# Patient Record
Sex: FEMALE | Race: WHITE | HISPANIC OR LATINO | Employment: FULL TIME | URBAN - METROPOLITAN AREA
[De-identification: names, ages, dates, MRNs, and addresses within clinical notes are randomized per-mention and may not be internally consistent; named-entity substitution may affect disease eponyms.]

---

## 2017-09-26 ENCOUNTER — HOSPITAL ENCOUNTER (EMERGENCY)
Facility: MEDICAL CENTER | Age: 32
End: 2017-09-26
Attending: EMERGENCY MEDICINE | Admitting: SURGERY

## 2017-09-26 ENCOUNTER — APPOINTMENT (OUTPATIENT)
Dept: RADIOLOGY | Facility: MEDICAL CENTER | Age: 32
End: 2017-09-26
Attending: EMERGENCY MEDICINE

## 2017-09-26 VITALS
SYSTOLIC BLOOD PRESSURE: 98 MMHG | HEART RATE: 70 BPM | TEMPERATURE: 97.4 F | BODY MASS INDEX: 19.47 KG/M2 | WEIGHT: 116.84 LBS | DIASTOLIC BLOOD PRESSURE: 59 MMHG | OXYGEN SATURATION: 95 % | HEIGHT: 65 IN | RESPIRATION RATE: 16 BRPM

## 2017-09-26 DIAGNOSIS — K80.01 CALCULUS OF GALLBLADDER WITH ACUTE CHOLECYSTITIS AND OBSTRUCTION: ICD-10-CM

## 2017-09-26 DIAGNOSIS — K81.9 CHOLECYSTITIS: ICD-10-CM

## 2017-09-26 LAB
ALBUMIN SERPL BCP-MCNC: 4.5 G/DL (ref 3.2–4.9)
ALBUMIN/GLOB SERPL: 1.3 G/DL
ALP SERPL-CCNC: 70 U/L (ref 30–99)
ALT SERPL-CCNC: 20 U/L (ref 2–50)
ANION GAP SERPL CALC-SCNC: 10 MMOL/L (ref 0–11.9)
APPEARANCE UR: ABNORMAL
AST SERPL-CCNC: 27 U/L (ref 12–45)
BACTERIA #/AREA URNS HPF: ABNORMAL /HPF
BASOPHILS # BLD AUTO: 0.5 % (ref 0–1.8)
BASOPHILS # BLD: 0.08 K/UL (ref 0–0.12)
BILIRUB SERPL-MCNC: 0.7 MG/DL (ref 0.1–1.5)
BILIRUB UR QL STRIP.AUTO: NEGATIVE
BUN SERPL-MCNC: 12 MG/DL (ref 8–22)
CALCIUM SERPL-MCNC: 9.7 MG/DL (ref 8.5–10.5)
CHLORIDE SERPL-SCNC: 104 MMOL/L (ref 96–112)
CO2 SERPL-SCNC: 24 MMOL/L (ref 20–33)
COLOR UR: ABNORMAL
CREAT SERPL-MCNC: 0.62 MG/DL (ref 0.5–1.4)
EOSINOPHIL # BLD AUTO: 0.09 K/UL (ref 0–0.51)
EOSINOPHIL NFR BLD: 0.6 % (ref 0–6.9)
EPI CELLS #/AREA URNS HPF: ABNORMAL /HPF
ERYTHROCYTE [DISTWIDTH] IN BLOOD BY AUTOMATED COUNT: 40.7 FL (ref 35.9–50)
GFR SERPL CREATININE-BSD FRML MDRD: >60 ML/MIN/1.73 M 2
GLOBULIN SER CALC-MCNC: 3.5 G/DL (ref 1.9–3.5)
GLUCOSE SERPL-MCNC: 107 MG/DL (ref 65–99)
GLUCOSE UR STRIP.AUTO-MCNC: NEGATIVE MG/DL
HCG SERPL QL: NEGATIVE
HCT VFR BLD AUTO: 44 % (ref 37–47)
HGB BLD-MCNC: 15 G/DL (ref 12–16)
HYALINE CASTS #/AREA URNS LPF: ABNORMAL /LPF
IMM GRANULOCYTES # BLD AUTO: 0.08 K/UL (ref 0–0.11)
IMM GRANULOCYTES NFR BLD AUTO: 0.5 % (ref 0–0.9)
KETONES UR STRIP.AUTO-MCNC: 80 MG/DL
LEUKOCYTE ESTERASE UR QL STRIP.AUTO: ABNORMAL
LIPASE SERPL-CCNC: 22 U/L (ref 11–82)
LYMPHOCYTES # BLD AUTO: 1.41 K/UL (ref 1–4.8)
LYMPHOCYTES NFR BLD: 8.9 % (ref 22–41)
MCH RBC QN AUTO: 31 PG (ref 27–33)
MCHC RBC AUTO-ENTMCNC: 34.1 G/DL (ref 33.6–35)
MCV RBC AUTO: 90.9 FL (ref 81.4–97.8)
MICRO URNS: ABNORMAL
MONOCYTES # BLD AUTO: 1.2 K/UL (ref 0–0.85)
MONOCYTES NFR BLD AUTO: 7.6 % (ref 0–13.4)
NEUTROPHILS # BLD AUTO: 13.03 K/UL (ref 2–7.15)
NEUTROPHILS NFR BLD: 81.9 % (ref 44–72)
NITRITE UR QL STRIP.AUTO: NEGATIVE
NRBC # BLD AUTO: 0 K/UL
NRBC BLD AUTO-RTO: 0 /100 WBC
PH UR STRIP.AUTO: >=9 [PH]
PLATELET # BLD AUTO: 320 K/UL (ref 164–446)
PMV BLD AUTO: 9.8 FL (ref 9–12.9)
POTASSIUM SERPL-SCNC: 3.6 MMOL/L (ref 3.6–5.5)
PROT SERPL-MCNC: 8 G/DL (ref 6–8.2)
PROT UR QL STRIP: 30 MG/DL
RBC # BLD AUTO: 4.84 M/UL (ref 4.2–5.4)
RBC # URNS HPF: ABNORMAL /HPF
RBC UR QL AUTO: NEGATIVE
SODIUM SERPL-SCNC: 138 MMOL/L (ref 135–145)
SP GR UR STRIP.AUTO: 1.03
UROBILINOGEN UR STRIP.AUTO-MCNC: 1 MG/DL
WBC # BLD AUTO: 15.9 K/UL (ref 4.8–10.8)
WBC #/AREA URNS HPF: ABNORMAL /HPF

## 2017-09-26 PROCEDURE — 501572 HCHG TROCAR, SHIELD OBTU 5X100: Performed by: SURGERY

## 2017-09-26 PROCEDURE — 501582 HCHG TROCAR, THRD BLADED: Performed by: SURGERY

## 2017-09-26 PROCEDURE — 501445 HCHG STAPLER, SKIN DISP: Performed by: SURGERY

## 2017-09-26 PROCEDURE — 700105 HCHG RX REV CODE 258: Performed by: EMERGENCY MEDICINE

## 2017-09-26 PROCEDURE — 85025 COMPLETE CBC W/AUTO DIFF WBC: CPT

## 2017-09-26 PROCEDURE — 96361 HYDRATE IV INFUSION ADD-ON: CPT

## 2017-09-26 PROCEDURE — 88304 TISSUE EXAM BY PATHOLOGIST: CPT

## 2017-09-26 PROCEDURE — 700102 HCHG RX REV CODE 250 W/ 637 OVERRIDE(OP)

## 2017-09-26 PROCEDURE — 83690 ASSAY OF LIPASE: CPT

## 2017-09-26 PROCEDURE — 500697 HCHG HEMOCLIP, LARGE (ORANGE): Performed by: SURGERY

## 2017-09-26 PROCEDURE — 502571 HCHG PACK, LAP CHOLE: Performed by: SURGERY

## 2017-09-26 PROCEDURE — 36415 COLL VENOUS BLD VENIPUNCTURE: CPT

## 2017-09-26 PROCEDURE — 160047 HCHG PACU  - EA ADDL 30 MINS PHASE II: Performed by: SURGERY

## 2017-09-26 PROCEDURE — 503052 HCHG HEMOSTAT POWDER-5GRAM: Performed by: SURGERY

## 2017-09-26 PROCEDURE — 96375 TX/PRO/DX INJ NEW DRUG ADDON: CPT

## 2017-09-26 PROCEDURE — 160048 HCHG OR STATISTICAL LEVEL 1-5: Performed by: SURGERY

## 2017-09-26 PROCEDURE — 160036 HCHG PACU - EA ADDL 30 MINS PHASE I: Performed by: SURGERY

## 2017-09-26 PROCEDURE — 160046 HCHG PACU - 1ST 60 MINS PHASE II: Performed by: SURGERY

## 2017-09-26 PROCEDURE — 160035 HCHG PACU - 1ST 60 MINS PHASE I: Performed by: SURGERY

## 2017-09-26 PROCEDURE — 76705 ECHO EXAM OF ABDOMEN: CPT

## 2017-09-26 PROCEDURE — A9270 NON-COVERED ITEM OR SERVICE: HCPCS

## 2017-09-26 PROCEDURE — 500002 HCHG ADHESIVE, DERMABOND: Performed by: SURGERY

## 2017-09-26 PROCEDURE — 81001 URINALYSIS AUTO W/SCOPE: CPT

## 2017-09-26 PROCEDURE — 160028 HCHG SURGERY MINUTES - 1ST 30 MINS LEVEL 3: Performed by: SURGERY

## 2017-09-26 PROCEDURE — 96374 THER/PROPH/DIAG INJ IV PUSH: CPT

## 2017-09-26 PROCEDURE — 160002 HCHG RECOVERY MINUTES (STAT): Performed by: SURGERY

## 2017-09-26 PROCEDURE — 503054 HCHG APPLICATOR-HEMOSTAT POWDER: Performed by: SURGERY

## 2017-09-26 PROCEDURE — 501838 HCHG SUTURE GENERAL: Performed by: SURGERY

## 2017-09-26 PROCEDURE — 84703 CHORIONIC GONADOTROPIN ASSAY: CPT

## 2017-09-26 PROCEDURE — 501399 HCHG SPECIMAN BAG, ENDO CATC: Performed by: SURGERY

## 2017-09-26 PROCEDURE — 700111 HCHG RX REV CODE 636 W/ 250 OVERRIDE (IP)

## 2017-09-26 PROCEDURE — 80053 COMPREHEN METABOLIC PANEL: CPT

## 2017-09-26 PROCEDURE — 160009 HCHG ANES TIME/MIN: Performed by: SURGERY

## 2017-09-26 PROCEDURE — 501338 HCHG SHEARS, ENDO: Performed by: SURGERY

## 2017-09-26 PROCEDURE — 501583 HCHG TROCAR, THRD CAN&SEAL 5X100: Performed by: SURGERY

## 2017-09-26 PROCEDURE — 99291 CRITICAL CARE FIRST HOUR: CPT

## 2017-09-26 PROCEDURE — 700101 HCHG RX REV CODE 250

## 2017-09-26 PROCEDURE — 700111 HCHG RX REV CODE 636 W/ 250 OVERRIDE (IP): Performed by: EMERGENCY MEDICINE

## 2017-09-26 PROCEDURE — 501571 HCHG TROCAR, SEPARATOR 12X100: Performed by: SURGERY

## 2017-09-26 PROCEDURE — 160039 HCHG SURGERY MINUTES - EA ADDL 1 MIN LEVEL 3: Performed by: SURGERY

## 2017-09-26 PROCEDURE — 160025 RECOVERY II MINUTES (STATS): Performed by: SURGERY

## 2017-09-26 RX ORDER — ONDANSETRON 2 MG/ML
4 INJECTION INTRAMUSCULAR; INTRAVENOUS ONCE
Status: COMPLETED | OUTPATIENT
Start: 2017-09-26 | End: 2017-09-26

## 2017-09-26 RX ORDER — HYDROMORPHONE HYDROCHLORIDE 2 MG/ML
INJECTION, SOLUTION INTRAMUSCULAR; INTRAVENOUS; SUBCUTANEOUS
Status: COMPLETED
Start: 2017-09-26 | End: 2017-09-26

## 2017-09-26 RX ORDER — SODIUM CHLORIDE 9 MG/ML
1000 INJECTION, SOLUTION INTRAVENOUS ONCE
Status: COMPLETED | OUTPATIENT
Start: 2017-09-26 | End: 2017-09-26

## 2017-09-26 RX ORDER — OXYCODONE HCL 5 MG/5 ML
SOLUTION, ORAL ORAL
Status: COMPLETED
Start: 2017-09-26 | End: 2017-09-26

## 2017-09-26 RX ORDER — BUPIVACAINE HYDROCHLORIDE AND EPINEPHRINE 5; 5 MG/ML; UG/ML
INJECTION, SOLUTION EPIDURAL; INTRACAUDAL; PERINEURAL
Status: DISCONTINUED | OUTPATIENT
Start: 2017-09-26 | End: 2017-09-26 | Stop reason: HOSPADM

## 2017-09-26 RX ADMIN — ONDANSETRON 4 MG: 2 INJECTION INTRAMUSCULAR; INTRAVENOUS at 13:29

## 2017-09-26 RX ADMIN — EPHEDRINE SULFATE 25 MG: 50 INJECTION INTRAMUSCULAR; INTRAVENOUS; SUBCUTANEOUS at 19:15

## 2017-09-26 RX ADMIN — HYDROMORPHONE HYDROCHLORIDE 0.25 MG: 2 INJECTION INTRAMUSCULAR; INTRAVENOUS; SUBCUTANEOUS at 18:06

## 2017-09-26 RX ADMIN — HYDROMORPHONE HYDROCHLORIDE 0.25 MG: 2 INJECTION INTRAMUSCULAR; INTRAVENOUS; SUBCUTANEOUS at 18:00

## 2017-09-26 RX ADMIN — HYDROMORPHONE HYDROCHLORIDE 1 MG: 1 INJECTION, SOLUTION INTRAMUSCULAR; INTRAVENOUS; SUBCUTANEOUS at 13:29

## 2017-09-26 RX ADMIN — SODIUM CHLORIDE 1000 ML: 9 INJECTION, SOLUTION INTRAVENOUS at 13:29

## 2017-09-26 RX ADMIN — OXYCODONE HYDROCHLORIDE 10 MG: 5 SOLUTION ORAL at 18:16

## 2017-09-26 ASSESSMENT — PAIN SCALES - GENERAL
PAINLEVEL_OUTOF10: 10
PAINLEVEL_OUTOF10: ASSUMED PAIN PRESENT
PAINLEVEL_OUTOF10: ASSUMED PAIN PRESENT
PAINLEVEL_OUTOF10: 0
PAINLEVEL_OUTOF10: 0
PAINLEVEL_OUTOF10: 4
PAINLEVEL_OUTOF10: 0
PAINLEVEL_OUTOF10: 4

## 2017-09-26 NOTE — H&P
DATE OF CONSULTATION:  09/26/2017    CHIEF COMPLAINT:  Abdominal pain since this morning.    HISTORY:  This 32-year-old female was seen at the request of the emergency   room.    She speaks no English and we had an  available for her questions.    She developed upper abdominal pain and has had a history thereof, but because   of its persistence was seen in the ER.  She has evidence for gallstones and a   white count of 17,000.  Liver function tests are normal.    She is without acholic stools, dark urine or jaundice.    PAST MEDICAL HISTORY:  OPERATIONS:  None.    MEDICAL DISEASES:  None.    MEDICATIONS:  None.    ALLERGIES:  None.    FAMILY HISTORY:  Positive for diabetes and gallstones.    SOCIAL HISTORY:  Patient is a nonsmoker and unemployed.    REVIEW OF SYSTEMS:  The patient has no other complaints.    PHYSICAL EXAMINATION:  VITAL SIGNS:  Temperature 98.8, blood pressure 112/72, pulse 80.  HEENT:  Without icterus.  Pupils equal, reactive to light and accommodation.  NECK:  Without masses, no JVD, no supraclavicular adenopathy, no thyromegaly.  CHEST:  Coarse breath sounds.  CARDIAC:  Auscultation unremarkable.  ABDOMEN:  Scaphoid, mild right upper quadrant tenderness, without guarding or   rebound.  RECTAL AND GENITALIA:  Deferred.  EXTREMITIES:  2+ pulses.    IMPRESSION:  Acute calculus cholecystitis.    PLAN:  The patient will undergo laparoscopic cholecystectomy.  Risks have been   explained to the  including death, bleeding, infection, necessity   for an incision, bile duct injury, retained stone post-cholecystectomy,   diarrhea, bile leak, time away from strenuous activities.  She and her family   had the opportunity to ask all her questions to the  and wish to   proceed.       ____________________________________     MD ELLY RENE / NTS    DD:  09/26/2017 16:49:34  DT:  09/26/2017 16:59:07    D#:  [id number]  Job#:  [id number]

## 2017-09-26 NOTE — ED PROVIDER NOTES
"ED Provider Note    CHIEF COMPLAINT  Chief Complaint   Patient presents with   • Epigastric Pain     onset 1115am   • N/V       HPI  Michelle Sykes is a 32 y.o. female who presents For evaluation of epigastric pain.  Patient's history was obtained to a friend who is interpreting along with my limited Irish.  The patient presents with onset of epigastric pain possibly an hour and a half ago.  The patient's been having intermittent symptoms for several years.  She has no specific food intolerance that she can recall.  She will have intermittent vomiting.  The patient denies: Fever, chills, cough, sputum, hematemesis, melena, hematochezia, lower abdominal pain, hematuria, dysuria, vaginal bleeding, vaginal discharge.  No other acute symptomatology or complaints.    REVIEW OF SYSTEMS  See HPI for further details.  No history of: Seizures, diabetes, thyroid dysfunction, cardiopulmonary disorders, gastrointestinal disorders.  All other systems negative.    PAST MEDICAL HISTORY  None    FAMILY HISTORY  No family history on file.    SOCIAL HISTORY  No history of tobacco, alcohol or drug abuse;    SURGICAL HISTORY  No past surgical history on file.    CURRENT MEDICATIONS  Home Medications     Reviewed by Afua Carey R.N. (Registered Nurse) on 09/26/17 at 1206  Med List Status: <None>   Medication Last Dose Status        Patient Jose Taking any Medications                       ALLERGIES  No Known Allergies    PHYSICAL EXAM  VITAL SIGNS: /94   Pulse 84   Temp 35.9 °C (96.7 °F) (Temporal)   Resp (!) 24   Ht 1.651 m (5' 5\")   Wt 53 kg (116 lb 13.5 oz)   LMP 09/08/2017   SpO2 100%   BMI 19.44 kg/m²    Constitutional: A 32-year-old female, Well developed, Well nourished, appears mildly uncomfortable, oriented ×3  HENT: Normocephalic, Atraumatic, Nares:Clear, Oropharynx: Mildly dry, posterior pharynx:clear   Eyes: PERRL, EOMI, Conjunctiva normal, No discharge.   Neck: Normal range of motion, No tenderness, " Supple, No stridor.   Lymphatic: No lymphadenopathy noted.   Cardiovascular: Regular rate and rhythm without mumurs, gallups, rubs   Thorax & Lungs: Normal Equal breath sounds, No respiratory distress, No wheezing, no stridor, no rales. No chest tenderness.   Abdomen: Tenderness in the epigastric area, nondistended, no organomegaly, positive bowel sounds normal in quality. No guarding or rebound.  No localized pain in McBurney's point; no lower abdominal tenderness; no nuchal adenopathy hernias or masses;  Skin: Good skin turgor, pink, warm, dry. No rashes, petechiae, purpura. Normal capillary refill.   Back: No tenderness, No CVA tenderness.   Extremities: Intact distal pulses, No edema, No tenderness, No cyanosis,  Vascular: Pulses are 2+, symmetric in the upper and lower extremities.  Musculoskeletal: Good range of motion in all major joints. No tenderness to palpation or major deformities noted.   Neurologic: Alert & oriented x 3,  No gross focal deficits noted.   Psychiatric: Affect normal, Judgment normal, Mood normal.       RADIOLOGY/PROCEDURES  US-GALLBLADDER   Final Result      1.  Gallbladder stones and sludge. Cholecystitis not excluded.   2.  Probable choledocholithiasis with mild intra and extrahepatic biliary dilatation               COURSE & MEDICAL DECISION MAKING  Pertinent Labs & Imaging studies reviewed. (See chart for details)  1.  Monitor  2.  IV normal saline  3.  Zofran, titrated  4.  Dilaudid, titrated    Laboratory studies: CBC shows white count 15.9, 81% neutrophils, 8% lymphocytes, 7% monocytes, hemoglobin 15.0, hematocrit 44.0; hCG is negative; lipase 22; CMP shows a glucose 107 otherwise within normal;    Discussion/consultation: This time, the patient presents for evaluation of epigastric pain.  The patient has findings consistent with cholelithiasis with possible associated cholecystitis.  The patient does have associated nausea and vomiting.  Therefore, I spoke with the general  surgeon on-call,, Dr. Schmitt.  He will be admitting the patient for operative intervention.  The patient has remained stable while in the emergency department.    FINAL IMPRESSION  1. Cholecystitis    2. Calculus of gallbladder with acute cholecystitis and obstruction       PLAN  1.  The patient will be admitted for further monitoring, treatment, and care;    Electronically signed by: Guy G Gansert, 9/26/2017 12:54 PM

## 2017-09-26 NOTE — ED NOTES
Pt amb to triage. Pt is Pashto speaking.  Chief Complaint   Patient presents with   • Epigastric Pain     onset 1115am   • N/V     Reports similar episodes in the past. No relief w/ advil.

## 2017-09-26 NOTE — ED NOTES
Med rec complete per Pt with   Pt denies any medication Rx or OTC  Allergies reviewed  No ABX in last 30 days

## 2017-09-27 NOTE — OP REPORT
DATE OF SERVICE:  09/26/2017    SURGEON:  Abel Reyes MD    PROCEDURE:  Laparoscopic cholecystectomy.    ANESTHESIA:  General.    ESTIMATED BLOOD LOSS:  Less than 10 mL    PREOPERATIVE DIAGNOSIS:  Acute calculus cholecystitis.    POSTOPERATIVE DIAGNOSIS:  Acute calculus cholecystitis.    SUMMARY:  A 32-year-old female was seen with an .    She has upper abdominal pain since yesterday and history thereof.    She has an ultrasound, which shows gallstones and a dilated gallbladder, at   this time will undergo laparoscopic cholecystectomy.    Risks have been explained through the .    DESCRIPTION OF PROCEDURE:  The patient was taken to the operating room and   satisfactory general anesthesia was induced, endotracheal intubation.  Patient   was prepped and draped, local was instilled in the infraumbilical position.    A small transverse incision made.  A Veress needle was used for insufflation.    A 12 mm port was inserted here as well as in the subxiphoid region with two 5   mm ports in mid clavicular and mid axillary line.  The gallbladder was   markedly distended and was retracted superiorly and laterally.  The cystic   duct and artery were identified, clipped and divided.  The gallbladder was   removed from the gallbladder fossa with cautery and removed from the abdominal   cavity with an EndoCatch bag.  Right upper quadrant was irrigated, fulgurated   and sucked dry.  Fabricio was placed in the gallbladder fossa.  All cannulas   removed with the midline closed with 0 Vicryl because of the fact that the   incision had to be enlarged to remove the specimen,.  The skin was closed with   4-0 Vicryl subcuticular skin stitches and Steri-Strips.  Wounds were dressed   and patient sent to recovery room in good condition.  Specimen sent to   pathology, labeled gallbladder.       ____________________________________     ABEL REYES MD    TER / NTS    DD:  09/26/2017 17:44:34  DT:  09/26/2017  18:22:22    D#:  [id number]  Job#:  [id number]

## 2017-09-27 NOTE — DISCHARGE INSTRUCTIONS
Instrucciones Para [place]  (Home Care Instructions)    ACTIVIDAD: Descanse y tome todo con mucha calma las primeras 24 horas después de menon cirugía.  Katarina persona adulta responsable debe permanecer con usted megan marcell periodo de tiempo.  Es normal sentirse sonoliento o sonolienta megan esas primeras horas.  Le recomendamos que no gisela nada que requiera equilibrio, zac decisiones a mucha coordinación de menon parte.    NO GISELA ESTO PURANTE LAS PRIMERAS 24 HORAS:   Manejar o conducir algún vehiculo, operar maquinarias o utilizar electrodomesticos.   Beber cerveza o algún otro tipo de bebida alcohólica.   Zac decisiones importantes o firmar documentos legales.    INSTRUCCIONES ESPECIALES: *  Colecistectomía laparoscópica - Cuidados posteriores  (Laparoscopic Cholecystectomy, Care After)  Siga estas instrucciones megan las próximas semanas. Estas indicaciones le proporcionan información general acerca de cómo deberá cuidarse después del procedimiento. El médico también podrá darle instrucciones más específicas. El tratamiento cárdenas sido planificado según las prácticas médicas actuales, javon en algunos casos pueden ocurrir problemas. Comuníquese con el médico si tiene algún problema o tiene dudas después del procedimiento.  QUÉ ESPERAR DESPUÉS DEL PROCEDIMIENTO  Después del procedimiento, es común tener las siguientes sensaciones:  · Dolor en los lugares de la incisión. Le darán analgésicos para controlar el dolor.  · Náuseas o vómitos leves. Estos síntomas deberían mejorar después de las primeras 24 horas.  · Meteorismo y posiblemente dolor en el hombro debido al gas que se usa megan el procedimiento. Estos síntomas mejorarán después de las primeras 24 horas.  INSTRUCCIONES PARA EL CUIDADO EN EL HOGAR   · Cambie los apósitos (vendajes) jamie lesli le indicó el médico.  · Mantenga la herida limpia y seca. Puede antoinette la herida suavemente con agua y jabón. Seque dando palmaditas suaves.  · No se bañe en la bañera, no  practique natación ni use el jacuzzy megan 2 semanas o hasta que lo autorice el médico.  · Victor solo medicamentos de venta galen o recetados, según las indicaciones del médico.  · Siga menon dieta normal según las indicaciones de menon médico.  · No levante ningún objeto que pese más de 10 libras (4,5 kg) hasta que el médico lo autorice.  · No practique deportes de contacto megan 1 semana o hasta que el médico lo autorice.  SOLICITE ATENCIÓN MÉDICA SI:   · Presenta enrojecimiento, hinchazón o aumento del dolor en la herida.  · Observa alfred secreción de color whalen amarillento (pus) en la herida.  · Hay alfred secreción en la herida que dura más de 1 día.  · Advierte un olor fétido que proviene de la herida o del vendaje.  · Los carver quirúrgicos (incisiones) se abren.  SOLICITE ATENCIÓN MÉDICA DE INMEDIATO SI:   · Le aparece alfred erupción cutánea.  · Tiene dificultad para respirar.  · Siente dolor en el pecho.  · Tiene fiebre.  · Nota un incremento del dolor en los hombros (en la samina donde van los breteles).  · Presenta episodios de mareos o se siente débil cuando está de pie.  · Siente un dolor abdominal intenso.  · Tiene malestar estomacal (náuseas) o vomita y esto dura más de 1 día.     Esta información no tiene loraine fin reemplazar el consejo del médico. Asegúrese de hacerle al médico cualquier pregunta que tenga.     Document Released: 07/30/2012 Document Revised: 10/08/2014  [employer] Interactive Patient Education ©2016 [employer]  **    DIETA: Para evitar las nauseas, prosiga despacito con menon dieta a medida que pueda ir tolerándola mejor, evite comidas muy condimentadas o grasosas megan marcell primer día.  Vaya agregando comidas más substanciadas a menon dieta a medida que asi lo indique menon médica.  Los bebés pueden beber leche preparada o formula, ásl loraine también leche del seno de la madre a medida que vayan teniendo hambre.  SIGA AGREGANDO LIQUIDOS Y COMIDAS CON FIBRA PARA EVITAR ESTREÑIMIENTO.    LORAINE BAÑARSE Y  CAMBIAR LOS VENDAJES DE LA CIRUGIA: ***    MEDICAMENTOS/MEDICINAS:  Vuelva a carmel alexi medicamentos diarios.  [place] los medicamentos que se le prescribe con un poco de comida.  Si no le prescribe ningún tipo de medicamento, entonces puede carmel medicinas para el dolor que no contienen aspirina, si las necesita.  LAS MEDICINAS PARA EL DOLOR PUEDEN ESTREÑIRLE MUCHO.  [place] un suavizante para el excremento o materia fecal (stool softener) o un laxativo lesli por ejemplo: senokot, pericolase, o leche de magnesia, si lo necesita.    La prescripción la administro *Keflex, Zofran, Percocet**.  La ultima sosis de medicina para el dolor fue administrada *6:16 PM**.     Se debe hacer alfred consulta medica con el doctor en *1-2 weeks**, Líame para hacer la jessica.    Usted debe LIAMAR A MENON MEDICO si tiene los siguientes síntomas:   -   Alfred fiebre más shaan de 101 grados Fahrenheit.   -   Un dolor incesante aún con los medicamentos, o nauseas y vómito persistente.   -   Un sangrado excesivo (ede que traspasa los vendajes o gasas) o algúln tipo de drenaje inesperado que proviene de la henda.     -   Un color mckee exagerado o hinchazón alrededor del área en donde se le hizo incisión o alaina, o un drenaje de pus o con olor bill proveniente de la henda.   -    La inhabilidad de orinar o vaciar menon vejiga en 8 horas.   -    Problemas con a respiración o janis en el pecho.    Usted debe llamar al 911 si se presentan problemas con el dolor al respirar o el pecho.  Si no se puede ponnoer en comunicación con un medica o con el centro de cirugía, usted debe ir a la estación de emergencia (emergency room) más cercana o a un centro de atención de urgencia (urgent care center).  El teléfono del medico es: *[phone]**    LOS SÍNTOMAS DE UN LEVE RESFRIO SON MUY NORMALES.  ADEMÁS USTED PUEDE LLEGAR A SENTIR JANIS GENERALES DE MÚSCULOS, IRRITACIÓN EN LA [place], JANIS DE DUDLEY Y/O UN POCO DE NAUSEAS.    Sie tiene alguna pregunta, llame a menon  médico.  Si menon médico no se encuentra disponible, por favor llame al Centro de Cirugía at [phone].  el Centro está abierto de Lunes a Viernes desde las 7:00 de la manana hasta las 7:00 de la noche.  ted también puede llamar al [facility] [place] o HEALTH HOTLINE.  Andreea está abierto viente y cuatro horas por brady, siete guerra por semana, allí podrá hablar con alfred enfermera.  Llame al [phone], o al número gratis [phone].    Mi firma a continuación indica que he recibido y entiendco estas instrucciones acera de los cuidados en la casa (Home Care Instructions)    Usradha recibirá alfred encuesta en la correspondencia en las siguientes semanas y le pedimos que por favor tome un momento para completar zoraida encuesta y regresaría a hosotros.  Nuestro objetivó es brindarle un cuidado muy stanley y par lo tanto apreciamos alexi coméntanos.  Muchas evelin por brielle escogido el [facility].

## 2017-11-18 ENCOUNTER — HOSPITAL ENCOUNTER (INPATIENT)
Facility: MEDICAL CENTER | Age: 32
LOS: 2 days | DRG: 440 | End: 2017-11-20
Attending: EMERGENCY MEDICINE | Admitting: INTERNAL MEDICINE

## 2017-11-18 ENCOUNTER — APPOINTMENT (OUTPATIENT)
Dept: RADIOLOGY | Facility: MEDICAL CENTER | Age: 32
DRG: 440 | End: 2017-11-18
Attending: EMERGENCY MEDICINE

## 2017-11-18 ENCOUNTER — RESOLUTE PROFESSIONAL BILLING HOSPITAL PROF FEE (OUTPATIENT)
Dept: HOSPITALIST | Facility: MEDICAL CENTER | Age: 32
End: 2017-11-18

## 2017-11-18 ENCOUNTER — APPOINTMENT (OUTPATIENT)
Dept: RADIOLOGY | Facility: MEDICAL CENTER | Age: 32
DRG: 440 | End: 2017-11-18
Attending: INTERNAL MEDICINE

## 2017-11-18 DIAGNOSIS — R10.84 GENERALIZED ABDOMINAL PAIN: ICD-10-CM

## 2017-11-18 DIAGNOSIS — R10.13 EPIGASTRIC ABDOMINAL PAIN: ICD-10-CM

## 2017-11-18 DIAGNOSIS — Z90.49 HISTORY OF CHOLECYSTECTOMY: ICD-10-CM

## 2017-11-18 DIAGNOSIS — R74.01 TRANSAMINITIS: ICD-10-CM

## 2017-11-18 DIAGNOSIS — K85.90 ACUTE PANCREATITIS, UNSPECIFIED COMPLICATION STATUS, UNSPECIFIED PANCREATITIS TYPE: ICD-10-CM

## 2017-11-18 PROBLEM — D72.829 LEUKOCYTOSIS: Status: ACTIVE | Noted: 2017-11-18

## 2017-11-18 PROBLEM — E87.6 HYPOKALEMIA: Status: ACTIVE | Noted: 2017-11-18

## 2017-11-18 LAB
ALBUMIN SERPL BCP-MCNC: 4 G/DL (ref 3.2–4.9)
ALBUMIN/GLOB SERPL: 1.3 G/DL
ALP SERPL-CCNC: 92 U/L (ref 30–99)
ALT SERPL-CCNC: 314 U/L (ref 2–50)
ANION GAP SERPL CALC-SCNC: 7 MMOL/L (ref 0–11.9)
APPEARANCE UR: CLEAR
APTT PPP: 29.2 SEC (ref 24.7–36)
AST SERPL-CCNC: 405 U/L (ref 12–45)
BASOPHILS # BLD AUTO: 0.4 % (ref 0–1.8)
BASOPHILS # BLD: 0.06 K/UL (ref 0–0.12)
BILIRUB SERPL-MCNC: 1.5 MG/DL (ref 0.1–1.5)
BUN SERPL-MCNC: 9 MG/DL (ref 8–22)
CALCIUM SERPL-MCNC: 9.1 MG/DL (ref 8.4–10.2)
CHLORIDE SERPL-SCNC: 105 MMOL/L (ref 96–112)
CO2 SERPL-SCNC: 26 MMOL/L (ref 20–33)
COLOR UR: ABNORMAL
CREAT SERPL-MCNC: 0.63 MG/DL (ref 0.5–1.4)
EOSINOPHIL # BLD AUTO: 0.08 K/UL (ref 0–0.51)
EOSINOPHIL NFR BLD: 0.6 % (ref 0–6.9)
ERYTHROCYTE [DISTWIDTH] IN BLOOD BY AUTOMATED COUNT: 45.5 FL (ref 35.9–50)
GFR SERPL CREATININE-BSD FRML MDRD: >60 ML/MIN/1.73 M 2
GLOBULIN SER CALC-MCNC: 3.1 G/DL (ref 1.9–3.5)
GLUCOSE SERPL-MCNC: 107 MG/DL (ref 65–99)
GLUCOSE UR STRIP.AUTO-MCNC: NEGATIVE MG/DL
HCG UR QL: NEGATIVE
HCT VFR BLD AUTO: 42.4 % (ref 37–47)
HGB BLD-MCNC: 14.4 G/DL (ref 12–16)
IMM GRANULOCYTES # BLD AUTO: 0.02 K/UL (ref 0–0.11)
IMM GRANULOCYTES NFR BLD AUTO: 0.1 % (ref 0–0.9)
INR PPP: 1.13 (ref 0.87–1.13)
KETONES UR STRIP.AUTO-MCNC: ABNORMAL MG/DL
LEUKOCYTE ESTERASE UR QL STRIP.AUTO: ABNORMAL
LIPASE SERPL-CCNC: >400 U/L (ref 7–58)
LYMPHOCYTES # BLD AUTO: 2.1 K/UL (ref 1–4.8)
LYMPHOCYTES NFR BLD: 15.4 % (ref 22–41)
MAGNESIUM SERPL-MCNC: 2 MG/DL (ref 1.5–2.5)
MCH RBC QN AUTO: 31.2 PG (ref 27–33)
MCHC RBC AUTO-ENTMCNC: 34 G/DL (ref 33.6–35)
MCV RBC AUTO: 91.8 FL (ref 81.4–97.8)
MONOCYTES # BLD AUTO: 1.48 K/UL (ref 0–0.85)
MONOCYTES NFR BLD AUTO: 10.8 % (ref 0–13.4)
NEUTROPHILS # BLD AUTO: 9.93 K/UL (ref 2–7.15)
NEUTROPHILS NFR BLD: 72.7 % (ref 44–72)
NITRITE UR QL STRIP.AUTO: NEGATIVE
NRBC # BLD AUTO: 0 K/UL
NRBC BLD AUTO-RTO: 0 /100 WBC
PH UR STRIP.AUTO: 6 [PH]
PHOSPHATE SERPL-MCNC: 3.5 MG/DL (ref 2.5–4.5)
PLATELET # BLD AUTO: 330 K/UL (ref 164–446)
PMV BLD AUTO: 9.8 FL (ref 9–12.9)
POTASSIUM SERPL-SCNC: 3.5 MMOL/L (ref 3.6–5.5)
PROT SERPL-MCNC: 7.1 G/DL (ref 6–8.2)
PROT UR QL STRIP: 30 MG/DL
PROTHROMBIN TIME: 14.3 SEC (ref 12–14.6)
RBC # BLD AUTO: 4.62 M/UL (ref 4.2–5.4)
RBC UR QL AUTO: NEGATIVE
SODIUM SERPL-SCNC: 138 MMOL/L (ref 135–145)
SP GR UR STRIP.AUTO: 1.02
WBC # BLD AUTO: 13.7 K/UL (ref 4.8–10.8)

## 2017-11-18 PROCEDURE — 96361 HYDRATE IV INFUSION ADD-ON: CPT

## 2017-11-18 PROCEDURE — 81025 URINE PREGNANCY TEST: CPT

## 2017-11-18 PROCEDURE — 83690 ASSAY OF LIPASE: CPT

## 2017-11-18 PROCEDURE — 74000 DX-ABDOMEN-1 VIEW: CPT

## 2017-11-18 PROCEDURE — 83735 ASSAY OF MAGNESIUM: CPT

## 2017-11-18 PROCEDURE — 85610 PROTHROMBIN TIME: CPT

## 2017-11-18 PROCEDURE — 99285 EMERGENCY DEPT VISIT HI MDM: CPT

## 2017-11-18 PROCEDURE — 94760 N-INVAS EAR/PLS OXIMETRY 1: CPT

## 2017-11-18 PROCEDURE — 85025 COMPLETE CBC W/AUTO DIFF WBC: CPT

## 2017-11-18 PROCEDURE — 700105 HCHG RX REV CODE 258: Performed by: INTERNAL MEDICINE

## 2017-11-18 PROCEDURE — 36415 COLL VENOUS BLD VENIPUNCTURE: CPT

## 2017-11-18 PROCEDURE — 96374 THER/PROPH/DIAG INJ IV PUSH: CPT

## 2017-11-18 PROCEDURE — 99223 1ST HOSP IP/OBS HIGH 75: CPT | Performed by: INTERNAL MEDICINE

## 2017-11-18 PROCEDURE — 80053 COMPREHEN METABOLIC PANEL: CPT

## 2017-11-18 PROCEDURE — 700111 HCHG RX REV CODE 636 W/ 250 OVERRIDE (IP): Performed by: INTERNAL MEDICINE

## 2017-11-18 PROCEDURE — 87040 BLOOD CULTURE FOR BACTERIA: CPT | Mod: 91

## 2017-11-18 PROCEDURE — 81002 URINALYSIS NONAUTO W/O SCOPE: CPT

## 2017-11-18 PROCEDURE — 76705 ECHO EXAM OF ABDOMEN: CPT

## 2017-11-18 PROCEDURE — 700102 HCHG RX REV CODE 250 W/ 637 OVERRIDE(OP): Performed by: EMERGENCY MEDICINE

## 2017-11-18 PROCEDURE — A9270 NON-COVERED ITEM OR SERVICE: HCPCS | Performed by: INTERNAL MEDICINE

## 2017-11-18 PROCEDURE — 85730 THROMBOPLASTIN TIME PARTIAL: CPT

## 2017-11-18 PROCEDURE — 700111 HCHG RX REV CODE 636 W/ 250 OVERRIDE (IP): Performed by: EMERGENCY MEDICINE

## 2017-11-18 PROCEDURE — 770006 HCHG ROOM/CARE - MED/SURG/GYN SEMI*

## 2017-11-18 PROCEDURE — 96375 TX/PRO/DX INJ NEW DRUG ADDON: CPT

## 2017-11-18 PROCEDURE — 84100 ASSAY OF PHOSPHORUS: CPT

## 2017-11-18 PROCEDURE — A9270 NON-COVERED ITEM OR SERVICE: HCPCS | Performed by: EMERGENCY MEDICINE

## 2017-11-18 PROCEDURE — 700102 HCHG RX REV CODE 250 W/ 637 OVERRIDE(OP): Performed by: INTERNAL MEDICINE

## 2017-11-18 RX ORDER — MORPHINE SULFATE 4 MG/ML
2 INJECTION, SOLUTION INTRAMUSCULAR; INTRAVENOUS ONCE
Status: COMPLETED | OUTPATIENT
Start: 2017-11-18 | End: 2017-11-18

## 2017-11-18 RX ORDER — OXYCODONE HYDROCHLORIDE 5 MG/1
5 TABLET ORAL
Status: DISCONTINUED | OUTPATIENT
Start: 2017-11-18 | End: 2017-11-20 | Stop reason: HOSPADM

## 2017-11-18 RX ORDER — ONDANSETRON 2 MG/ML
4 INJECTION INTRAMUSCULAR; INTRAVENOUS ONCE
Status: DISCONTINUED | OUTPATIENT
Start: 2017-11-18 | End: 2017-11-18

## 2017-11-18 RX ORDER — ONDANSETRON 4 MG/1
4 TABLET, ORALLY DISINTEGRATING ORAL EVERY 4 HOURS PRN
Status: DISCONTINUED | OUTPATIENT
Start: 2017-11-18 | End: 2017-11-20 | Stop reason: HOSPADM

## 2017-11-18 RX ORDER — ONDANSETRON 2 MG/ML
4 INJECTION INTRAMUSCULAR; INTRAVENOUS EVERY 4 HOURS PRN
Status: DISCONTINUED | OUTPATIENT
Start: 2017-11-18 | End: 2017-11-18

## 2017-11-18 RX ORDER — SODIUM CHLORIDE 9 MG/ML
1000 INJECTION, SOLUTION INTRAVENOUS ONCE
Status: DISCONTINUED | OUTPATIENT
Start: 2017-11-18 | End: 2017-11-18

## 2017-11-18 RX ORDER — CIPROFLOXACIN 2 MG/ML
400 INJECTION, SOLUTION INTRAVENOUS EVERY 12 HOURS
Status: DISCONTINUED | OUTPATIENT
Start: 2017-11-18 | End: 2017-11-20 | Stop reason: HOSPADM

## 2017-11-18 RX ORDER — AMOXICILLIN 250 MG
2 CAPSULE ORAL 2 TIMES DAILY
Status: DISCONTINUED | OUTPATIENT
Start: 2017-11-18 | End: 2017-11-20 | Stop reason: HOSPADM

## 2017-11-18 RX ORDER — SODIUM CHLORIDE 9 MG/ML
30 INJECTION, SOLUTION INTRAVENOUS ONCE
Status: COMPLETED | OUTPATIENT
Start: 2017-11-18 | End: 2017-11-18

## 2017-11-18 RX ORDER — ACETAMINOPHEN 325 MG/1
650 TABLET ORAL EVERY 6 HOURS PRN
Status: DISCONTINUED | OUTPATIENT
Start: 2017-11-18 | End: 2017-11-20 | Stop reason: HOSPADM

## 2017-11-18 RX ORDER — MORPHINE SULFATE 4 MG/ML
4 INJECTION, SOLUTION INTRAMUSCULAR; INTRAVENOUS ONCE
Status: DISCONTINUED | OUTPATIENT
Start: 2017-11-18 | End: 2017-11-18

## 2017-11-18 RX ORDER — PROMETHAZINE HYDROCHLORIDE 25 MG/1
12.5-25 TABLET ORAL EVERY 4 HOURS PRN
Status: DISCONTINUED | OUTPATIENT
Start: 2017-11-18 | End: 2017-11-20 | Stop reason: HOSPADM

## 2017-11-18 RX ORDER — BISACODYL 10 MG
10 SUPPOSITORY, RECTAL RECTAL
Status: DISCONTINUED | OUTPATIENT
Start: 2017-11-18 | End: 2017-11-20 | Stop reason: HOSPADM

## 2017-11-18 RX ORDER — OXYCODONE HYDROCHLORIDE 5 MG/1
2.5 TABLET ORAL
Status: DISCONTINUED | OUTPATIENT
Start: 2017-11-18 | End: 2017-11-20 | Stop reason: HOSPADM

## 2017-11-18 RX ORDER — POLYETHYLENE GLYCOL 3350 17 G/17G
1 POWDER, FOR SOLUTION ORAL
Status: DISCONTINUED | OUTPATIENT
Start: 2017-11-18 | End: 2017-11-20 | Stop reason: HOSPADM

## 2017-11-18 RX ORDER — POTASSIUM CHLORIDE 20 MEQ/1
40 TABLET, EXTENDED RELEASE ORAL EVERY 4 HOURS
Status: COMPLETED | OUTPATIENT
Start: 2017-11-18 | End: 2017-11-19

## 2017-11-18 RX ORDER — PROMETHAZINE HYDROCHLORIDE 25 MG/1
12.5-25 SUPPOSITORY RECTAL EVERY 4 HOURS PRN
Status: DISCONTINUED | OUTPATIENT
Start: 2017-11-18 | End: 2017-11-20 | Stop reason: HOSPADM

## 2017-11-18 RX ORDER — MORPHINE SULFATE 4 MG/ML
2 INJECTION, SOLUTION INTRAMUSCULAR; INTRAVENOUS
Status: DISCONTINUED | OUTPATIENT
Start: 2017-11-18 | End: 2017-11-20 | Stop reason: HOSPADM

## 2017-11-18 RX ORDER — SODIUM CHLORIDE, SODIUM LACTATE, POTASSIUM CHLORIDE, CALCIUM CHLORIDE 600; 310; 30; 20 MG/100ML; MG/100ML; MG/100ML; MG/100ML
INJECTION, SOLUTION INTRAVENOUS CONTINUOUS
Status: DISCONTINUED | OUTPATIENT
Start: 2017-11-18 | End: 2017-11-20 | Stop reason: HOSPADM

## 2017-11-18 RX ORDER — ONDANSETRON 2 MG/ML
4 INJECTION INTRAMUSCULAR; INTRAVENOUS EVERY 4 HOURS PRN
Status: DISCONTINUED | OUTPATIENT
Start: 2017-11-18 | End: 2017-11-20 | Stop reason: HOSPADM

## 2017-11-18 RX ADMIN — PROMETHAZINE HYDROCHLORIDE 25 MG: 25 TABLET ORAL at 21:34

## 2017-11-18 RX ADMIN — MORPHINE SULFATE 2 MG: 4 INJECTION INTRAVENOUS at 19:52

## 2017-11-18 RX ADMIN — SODIUM CHLORIDE 1590 ML: 9 INJECTION, SOLUTION INTRAVENOUS at 19:20

## 2017-11-18 RX ADMIN — POTASSIUM CHLORIDE 40 MEQ: 1500 TABLET, EXTENDED RELEASE ORAL at 22:55

## 2017-11-18 RX ADMIN — ONDANSETRON 4 MG: 2 INJECTION, SOLUTION INTRAMUSCULAR; INTRAVENOUS at 19:52

## 2017-11-18 RX ADMIN — DOCUSATE SODIUM AND SENNOSIDES 2 TABLET: 8.6; 5 TABLET, FILM COATED ORAL at 21:34

## 2017-11-18 RX ADMIN — CIPROFLOXACIN 400 MG: 2 INJECTION, SOLUTION INTRAVENOUS at 22:30

## 2017-11-18 RX ADMIN — LIDOCAINE HYDROCHLORIDE 30 ML: 20 SOLUTION OROPHARYNGEAL at 17:26

## 2017-11-18 ASSESSMENT — COPD QUESTIONNAIRES
DURING THE PAST 4 WEEKS HOW MUCH DID YOU FEEL SHORT OF BREATH: NONE/LITTLE OF THE TIME
DO YOU EVER COUGH UP ANY MUCUS OR PHLEGM?: NO/ONLY WITH OCCASIONAL COLDS OR INFECTIONS
COPD SCREENING SCORE: 0
HAVE YOU SMOKED AT LEAST 100 CIGARETTES IN YOUR ENTIRE LIFE: NO/DON'T KNOW

## 2017-11-18 ASSESSMENT — ENCOUNTER SYMPTOMS
VOMITING: 1
NECK PAIN: 0
MYALGIAS: 0
DIZZINESS: 0
DIAPHORESIS: 0
NAUSEA: 1
CONSTIPATION: 0
PALPITATIONS: 0
DEPRESSION: 0
HEADACHES: 0
SHORTNESS OF BREATH: 0
HEMOPTYSIS: 0
FALLS: 0
FEVER: 0
COUGH: 0
WHEEZING: 0
SPUTUM PRODUCTION: 0
ABDOMINAL PAIN: 1
FLANK PAIN: 0
BACK PAIN: 0
DIARRHEA: 0
PND: 0
WEAKNESS: 0
DOUBLE VISION: 0
CHILLS: 0
BLURRED VISION: 0
BLOOD IN STOOL: 0
WEIGHT LOSS: 0

## 2017-11-18 ASSESSMENT — PAIN SCALES - GENERAL
PAINLEVEL_OUTOF10: 6
PAINLEVEL_OUTOF10: 3
PAINLEVEL_OUTOF10: 4

## 2017-11-18 ASSESSMENT — LIFESTYLE VARIABLES: EVER_SMOKED: NEVER

## 2017-11-18 NOTE — ED NOTES
Pt reports a Hx of cholecystectomy the 26 th of September.  She C/O intermittent RUQ pain since yesterday.

## 2017-11-19 ENCOUNTER — APPOINTMENT (OUTPATIENT)
Dept: RADIOLOGY | Facility: MEDICAL CENTER | Age: 32
DRG: 440 | End: 2017-11-19
Attending: INTERNAL MEDICINE

## 2017-11-19 LAB
ALBUMIN SERPL BCP-MCNC: 3.5 G/DL (ref 3.2–4.9)
ALBUMIN/GLOB SERPL: 1.3 G/DL
ALP SERPL-CCNC: 91 U/L (ref 30–99)
ALT SERPL-CCNC: 304 U/L (ref 2–50)
ANION GAP SERPL CALC-SCNC: 5 MMOL/L (ref 0–11.9)
AST SERPL-CCNC: 213 U/L (ref 12–45)
BASOPHILS # BLD AUTO: 0.4 % (ref 0–1.8)
BASOPHILS # BLD: 0.03 K/UL (ref 0–0.12)
BILIRUB SERPL-MCNC: 1.1 MG/DL (ref 0.1–1.5)
BUN SERPL-MCNC: 7 MG/DL (ref 8–22)
CALCIUM SERPL-MCNC: 8.7 MG/DL (ref 8.4–10.2)
CHLORIDE SERPL-SCNC: 107 MMOL/L (ref 96–112)
CHOLEST SERPL-MCNC: 106 MG/DL (ref 100–199)
CO2 SERPL-SCNC: 26 MMOL/L (ref 20–33)
CREAT SERPL-MCNC: 0.55 MG/DL (ref 0.5–1.4)
EOSINOPHIL # BLD AUTO: 0.01 K/UL (ref 0–0.51)
EOSINOPHIL NFR BLD: 0.1 % (ref 0–6.9)
ERYTHROCYTE [DISTWIDTH] IN BLOOD BY AUTOMATED COUNT: 48 FL (ref 35.9–50)
GFR SERPL CREATININE-BSD FRML MDRD: >60 ML/MIN/1.73 M 2
GLOBULIN SER CALC-MCNC: 2.8 G/DL (ref 1.9–3.5)
GLUCOSE SERPL-MCNC: 100 MG/DL (ref 65–99)
HCT VFR BLD AUTO: 36.8 % (ref 37–47)
HDLC SERPL-MCNC: 57 MG/DL
HGB BLD-MCNC: 12.1 G/DL (ref 12–16)
IMM GRANULOCYTES # BLD AUTO: 0.04 K/UL (ref 0–0.11)
IMM GRANULOCYTES NFR BLD AUTO: 0.5 % (ref 0–0.9)
LDLC SERPL CALC-MCNC: 45 MG/DL
LYMPHOCYTES # BLD AUTO: 1.7 K/UL (ref 1–4.8)
LYMPHOCYTES NFR BLD: 20.2 % (ref 22–41)
MAGNESIUM SERPL-MCNC: 1.8 MG/DL (ref 1.5–2.5)
MCH RBC QN AUTO: 31 PG (ref 27–33)
MCHC RBC AUTO-ENTMCNC: 32.9 G/DL (ref 33.6–35)
MCV RBC AUTO: 94.4 FL (ref 81.4–97.8)
MONOCYTES # BLD AUTO: 0.69 K/UL (ref 0–0.85)
MONOCYTES NFR BLD AUTO: 8.2 % (ref 0–13.4)
NEUTROPHILS # BLD AUTO: 5.95 K/UL (ref 2–7.15)
NEUTROPHILS NFR BLD: 70.6 % (ref 44–72)
NRBC # BLD AUTO: 0 K/UL
NRBC BLD AUTO-RTO: 0 /100 WBC
PHOSPHATE SERPL-MCNC: 3.2 MG/DL (ref 2.5–4.5)
PLATELET # BLD AUTO: 253 K/UL (ref 164–446)
PMV BLD AUTO: 10 FL (ref 9–12.9)
POTASSIUM SERPL-SCNC: 4.3 MMOL/L (ref 3.6–5.5)
PROT SERPL-MCNC: 6.3 G/DL (ref 6–8.2)
RBC # BLD AUTO: 3.9 M/UL (ref 4.2–5.4)
SODIUM SERPL-SCNC: 138 MMOL/L (ref 135–145)
TRIGL SERPL-MCNC: 19 MG/DL (ref 0–149)
WBC # BLD AUTO: 8.4 K/UL (ref 4.8–10.8)

## 2017-11-19 PROCEDURE — 83735 ASSAY OF MAGNESIUM: CPT

## 2017-11-19 PROCEDURE — 700105 HCHG RX REV CODE 258: Performed by: INTERNAL MEDICINE

## 2017-11-19 PROCEDURE — 700101 HCHG RX REV CODE 250: Performed by: INTERNAL MEDICINE

## 2017-11-19 PROCEDURE — 80053 COMPREHEN METABOLIC PANEL: CPT

## 2017-11-19 PROCEDURE — 84100 ASSAY OF PHOSPHORUS: CPT

## 2017-11-19 PROCEDURE — 36415 COLL VENOUS BLD VENIPUNCTURE: CPT

## 2017-11-19 PROCEDURE — 770006 HCHG ROOM/CARE - MED/SURG/GYN SEMI*

## 2017-11-19 PROCEDURE — 700111 HCHG RX REV CODE 636 W/ 250 OVERRIDE (IP): Performed by: INTERNAL MEDICINE

## 2017-11-19 PROCEDURE — 80061 LIPID PANEL: CPT

## 2017-11-19 PROCEDURE — 85025 COMPLETE CBC W/AUTO DIFF WBC: CPT

## 2017-11-19 PROCEDURE — 99232 SBSQ HOSP IP/OBS MODERATE 35: CPT | Performed by: FAMILY MEDICINE

## 2017-11-19 PROCEDURE — 700102 HCHG RX REV CODE 250 W/ 637 OVERRIDE(OP): Performed by: INTERNAL MEDICINE

## 2017-11-19 PROCEDURE — A9270 NON-COVERED ITEM OR SERVICE: HCPCS | Performed by: INTERNAL MEDICINE

## 2017-11-19 PROCEDURE — 78226 HEPATOBILIARY SYSTEM IMAGING: CPT

## 2017-11-19 RX ORDER — IBUPROFEN 200 MG
400 TABLET ORAL EVERY 6 HOURS PRN
Status: ON HOLD | COMMUNITY
End: 2017-11-20

## 2017-11-19 RX ADMIN — ACETAMINOPHEN 650 MG: 325 TABLET, FILM COATED ORAL at 13:58

## 2017-11-19 RX ADMIN — CIPROFLOXACIN 400 MG: 2 INJECTION, SOLUTION INTRAVENOUS at 21:15

## 2017-11-19 RX ADMIN — SODIUM CHLORIDE, POTASSIUM CHLORIDE, SODIUM LACTATE AND CALCIUM CHLORIDE: 600; 310; 30; 20 INJECTION, SOLUTION INTRAVENOUS at 12:54

## 2017-11-19 RX ADMIN — CIPROFLOXACIN 400 MG: 2 INJECTION, SOLUTION INTRAVENOUS at 10:16

## 2017-11-19 RX ADMIN — POTASSIUM CHLORIDE 40 MEQ: 1500 TABLET, EXTENDED RELEASE ORAL at 03:36

## 2017-11-19 RX ADMIN — DOCUSATE SODIUM AND SENNOSIDES 2 TABLET: 8.6; 5 TABLET, FILM COATED ORAL at 21:16

## 2017-11-19 RX ADMIN — METRONIDAZOLE 500 MG: 500 INJECTION, SOLUTION INTRAVENOUS at 13:41

## 2017-11-19 RX ADMIN — METRONIDAZOLE 500 MG: 500 INJECTION, SOLUTION INTRAVENOUS at 06:39

## 2017-11-19 RX ADMIN — SODIUM CHLORIDE, POTASSIUM CHLORIDE, SODIUM LACTATE AND CALCIUM CHLORIDE: 600; 310; 30; 20 INJECTION, SOLUTION INTRAVENOUS at 05:18

## 2017-11-19 RX ADMIN — METRONIDAZOLE 500 MG: 500 INJECTION, SOLUTION INTRAVENOUS at 00:12

## 2017-11-19 RX ADMIN — METRONIDAZOLE 500 MG: 500 INJECTION, SOLUTION INTRAVENOUS at 22:31

## 2017-11-19 RX ADMIN — SODIUM CHLORIDE, POTASSIUM CHLORIDE, SODIUM LACTATE AND CALCIUM CHLORIDE: 600; 310; 30; 20 INJECTION, SOLUTION INTRAVENOUS at 00:11

## 2017-11-19 ASSESSMENT — ENCOUNTER SYMPTOMS
NAUSEA: 0
HEADACHES: 0
PALPITATIONS: 0
DIZZINESS: 0
MYALGIAS: 0
CHILLS: 0
DEPRESSION: 0
HEARTBURN: 0
BLURRED VISION: 0
HEMOPTYSIS: 0
FEVER: 0
BRUISES/BLEEDS EASILY: 0
DOUBLE VISION: 0
NECK PAIN: 0
COUGH: 0

## 2017-11-19 ASSESSMENT — PAIN SCALES - GENERAL: PAINLEVEL_OUTOF10: 2

## 2017-11-19 ASSESSMENT — LIFESTYLE VARIABLES: DO YOU DRINK ALCOHOL: NO

## 2017-11-19 NOTE — DISCHARGE PLANNING
Care Transition Team Assessment    SW intern met with patient at bedside. Patient reports she has no PCP or medical insurance. Patient reports she lives with her sister. Patient plans to discharge home when medically able. Patient has no questions or concerns at this time.     Information Source  Orientation : Oriented x 4  Information Given By: Patient  Informant's Name: Michelle   Who is responsible for making decisions for patient? : Patient    Readmission Evaluation  Is this a readmission?: No    Elopement Risk  Legal Hold: No  Ambulatory or Self Mobile in Wheelchair: Yes  Disoriented: No  Psychiatric Symptoms: None  History of Wandering: No  Elopement this Admit: No  Vocalizing Wanting to Leave: No  Displays Behaviors, Body Language Wanting to Leave: No-Not at Risk for Elopement  Elopement Risk: Not at Risk for Elopement         Discharge Preparedness  What is your plan after discharge?: Home with help  What are your discharge supports?: Sibling  Prior Functional Level: Independent with Activities of Daily Living  Difficulity with ADLs: None  Difficulity with IADLs: None    Functional Assesment  Prior Functional Level: Independent with Activities of Daily Living    Finances  Financial Barriers to Discharge: No  Prescription Coverage: Yes     Advance Directive  Advance Directive?: None  Advance Directive offered?: AD Booklet refused    Domestic Abuse  Have you ever been the victim of abuse or violence?: No    Psychological Assessment  History of Substance Abuse: None  History of Psychiatric Problems: No  Non-compliant with Treatment: No  Newly Diagnosed Illness: No    Discharge Risks or Barriers  Discharge risks or barriers?: No PCP, Uninsured / underinsured  Patient risk factors: No PCP, Uninsured or underinsured    Anticipated Discharge Information  Anticipated discharge disposition: Home  Discharge Address: [address] [place] [address]  Discharge Contact Phone Number: [phone]    This note has been  reviewed by Ángela GARCIA

## 2017-11-19 NOTE — CARE PLAN
Problem: Communication  Goal: The ability to communicate needs accurately and effectively will improve  Patient is Latvian speaking. Her family was at bedside at initial admission and interpreted during assessment. Patient tries to communicate in English and appears to understand instructions.     Problem: Knowledge Deficit  Goal: Knowledge of the prescribed therapeutic regimen will improve  Patient and family educated in plan of care including scheduled diagnostic tests. Patient and family in agreement with plan of care.

## 2017-11-19 NOTE — ASSESSMENT & PLAN NOTE
"S/P recent cholecystectomy 09/26/2017  Surgeon Dr Schmitt  Pathology \"Cholelithiasis. Benign gallbladder with mild acute and chronic inflammation\"  Now presents with Epigastric pain with transaminitis, slight hyperbilir and lipase > 400  Acute pancreatitis is most likely  Underlying cholangitis cannot be ruled out given leukocytosis  Retained GS cannot be ruled out  Continue with IV hydration.  Follow up with HIDA scan results  Follow up with MRCP results.  "

## 2017-11-19 NOTE — PROGRESS NOTES
Received report from NOC RN; assumed pt care. Pt A&Ox4, resting in bed. No questions or concerns at this time. Pt notified to call for assistance.

## 2017-11-19 NOTE — PROGRESS NOTES
[facility] Hospitalist Progress Note    Date of Service: 2017    Chief Complaint  32 y.o. female admitted 2017 with abdominal pain    Interval Problem Update  Patient was seen and examined. Feels well this morning. Get her ready to go down for HIDA scan and MRCP.    Consultants/Specialty  Note    Disposition  Home        Review of Systems   Constitutional: Negative for chills and fever.   HENT: Negative for hearing loss and tinnitus.    Eyes: Negative for blurred vision and double vision.   Respiratory: Negative for cough and hemoptysis.    Cardiovascular: Negative for chest pain and palpitations.   Gastrointestinal: Negative for heartburn and nausea.   Genitourinary: Negative for dysuria and urgency.   Musculoskeletal: Negative for myalgias and neck pain.   Skin: Negative for rash.   Neurological: Negative for dizziness and headaches.   Endo/Heme/Allergies: Does not bruise/bleed easily.   Psychiatric/Behavioral: Negative for depression and suicidal ideas.      Physical Exam  Laboratory/Imaging   Hemodynamics  Temp (24hrs), Av.6 °C (97.8 °F), Min:36.4 °C (97.5 °F), Max:36.8 °C (98.3 °F)   Temperature: 36.8 °C (98.3 °F)  Pulse  Av.2  Min: 62  Max: 83    Blood Pressure: (!) 90/52      Respiratory      Respiration: 18, Pulse Oximetry: 98 %, O2 Daily Delivery Respiratory : Room Air with O2 Available     Work Of Breathing / Effort: Mild  RUL Breath Sounds: Clear, RML Breath Sounds: Clear, RLL Breath Sounds: Clear, LOC Breath Sounds: Clear, LLL Breath Sounds: Clear    Fluids    Intake/Output Summary (Last 24 hours) at 17 1207  Last data filed at 17 0000   Gross per 24 hour   Intake             1150 ml   Output                4 ml   Net             1146 ml       Nutrition  Orders Placed This Encounter   Procedures   • DIET NPO     Standing Status:   Standing     Number of Occurrences:   1     Order Specific Question:   Restrict to:     Answer:   Sips with Medications [3]     Physical Exam  "  Constitutional: She is oriented to person, place, and time. She appears well-developed and well-nourished.   HENT:   Head: Normocephalic and atraumatic.   Eyes: Conjunctivae are normal. Pupils are equal, round, and reactive to light.   Neck: Neck supple. No thyromegaly present.   Cardiovascular: Normal rate, regular rhythm and normal heart sounds.    Pulmonary/Chest: Breath sounds normal. No respiratory distress. She has no wheezes.   Abdominal: Soft. She exhibits no distension. There is tenderness (mild tenderness in the epigastrium).   Musculoskeletal: She exhibits no edema or deformity.   Neurological: She is alert and oriented to person, place, and time. No cranial nerve deficit.   Skin: Skin is dry. No erythema.   Psychiatric: She has a normal mood and affect. Her behavior is normal.       Recent Labs      11/18/17   1725  11/19/17   0533   WBC  13.7*  8.4   RBC  4.62  3.90*   HEMOGLOBIN  14.4  12.1   HEMATOCRIT  42.4  36.8*   MCV  91.8  94.4   MCH  31.2  31.0   MCHC  34.0  32.9*   RDW  45.5  48.0   PLATELETCT  330  253   MPV  9.8  10.0     Recent Labs      11/18/17   1725  11/19/17   0533   SODIUM  138  138   POTASSIUM  3.5*  4.3   CHLORIDE  105  107   CO2  26  26   GLUCOSE  107*  100*   BUN  9  7*   CREATININE  0.63  0.55   CALCIUM  9.1  8.7     Recent Labs      11/18/17   1725   APTT  29.2   INR  1.13         Recent Labs      11/19/17   0533   TRIGLYCERIDE  19   HDL  57   LDL  45          Assessment/Plan     Pancreatitis- (present on admission)   Assessment & Plan    S/P recent cholecystectomy 09/26/2017  Surgeon Dr Schmitt  Pathology \"Cholelithiasis. Benign gallbladder with mild acute and chronic inflammation\"  Now presents with Epigastric pain with transaminitis, slight hyperbilir and lipase > 400  Acute pancreatitis is most likely  Underlying cholangitis cannot be ruled out given leukocytosis  Retained GS cannot be ruled out  Continue with IV hydration.  Follow up with HIDA scan results  Follow up " with MRCP results.        Transaminitis- (present on admission)   Assessment & Plan    Pancreatitis related  Continue to monitor        Leukocytosis- (present on admission)   Assessment & Plan    On antibiotics. Pancultures. Cogentin monitor.        Hypokalemia- (present on admission)   Assessment & Plan    Replaced and corrected            Reviewed items::  Labs reviewed, Medications reviewed and Radiology images reviewed  Reece catheter::  No Reece  DVT prophylaxis pharmacological::  Enoxaparin (Lovenox)  Antibiotics:  Treating active infection/contamination beyond 24 hours perioperative coverage

## 2017-11-19 NOTE — PROGRESS NOTES
Correction for my last entry. Upon further investigation, it was noticed Patient needs Biliary scan for R/O leak. Will proceed today with that test. No prep needed

## 2017-11-19 NOTE — ED NOTES
Admitting orders received. Waiting for bed assignment. Medicinal interventions carried out per ERP orders.

## 2017-11-19 NOTE — ED NOTES
ERP at bedside. Pt agrees with plan of care discussed by ERP. AIDET acknowledged with patient. IV established. Blood sent to lab. Pain level 8/10 prior to GI cocktail. Gurney in low position, side rail up for pt safety. Call light within reach. Will continue to monitor.

## 2017-11-19 NOTE — ED PROVIDER NOTES
ED Provider Note    CHIEF COMPLAINT  Chief Complaint   Patient presents with   • Abdominal Pain       HPI    Primary care provider: No primary care provider on file.  Means of arrival:POV  History obtained from: Patient  History limited by: Nothing    Michelle Sykes is a 32 y.o. female who presents with epigastric abdominal pain onset yesterday. The pain is described as burning, intermittent, no obvious aggravating or alleviating factors noted. At worst the pain is moderate, at present minimal. Whenever the pain flares up she does vomit, this is never been bloody or bilious. Denies any urinary symptoms or diarrhea or constipation. Of note the patient did have a laparoscopic cholecystectomy on September 26, she had an uneventful postoperative course and had been feeling well since that time. She denies any cough, chest pain, dyspnea, fevers, or radiation of pain. This feels similar to the pain that she had prior to her cholecystectomy.    REVIEW OF SYSTEMS  Constitutional: Negative for fever or chills.   HENT: Negative for nosebleeds or sore throat.    Eyes: Negative for vision changes or discharge.   Respiratory: Negative for cough or shortness of breath.    Cardiovascular: Negative for chest pain or palpitations.   Gastrointestinal: Positive for intermittent nausea, vomiting, and abdominal pain.  Genitourinary: Negative for dysuria or flank pain.   Musculoskeletal: Negative for back pain or joint pain.   Skin: Negative for itching or rash.   Neurological: Negative for sensory or motor changes.   Endo/Heme/Allergies: Negative for weight changes or hives.   Psychiatric/Behavioral: Negative for depression or suicidal ideas.       PAST MEDICAL HISTORY       PAST FAMILY HISTORY  History reviewed. No pertinent family history.    SOCIAL HISTORY  Social History     Social History Main Topics   • Smoking status: Never Smoker   • Smokeless tobacco: Never Used   • Alcohol use No   • Drug use: No   • Sexual activity: Not on file  "      SURGICAL HISTORY   has a past surgical history that includes amelie by laparoscopy (9/26/2017).    CURRENT MEDICATIONS  Home Medications     Reviewed by Jeffrey Moss (Pharmacy Tech) on 11/19/17 at 0804  Med List Status: Complete   Medication Last Dose Status   ibuprofen (MOTRIN) 200 MG Tab 11/17/2017 Active                ALLERGIES  No Known Allergies    PHYSICAL EXAM  VITAL SIGNS: BP (!) 90/52   Pulse 64   Temp 36.8 °C (98.3 °F)   Resp 18   Ht 1.6 m (5' 3\") Comment: Stated  Wt 53 kg (116 lb 13.5 oz)   LMP 11/05/2017   SpO2 98%   BMI 20.70 kg/m²    Pulse ox interpretation: On room air, I interpret this pulse ox as normal.  Constitutional: Well developed, well nourished. No acute distress.  HEENT: Normocephalic, atraumatic. Posterior pharynx clear, mucous membranes moist.  Eyes:  EOMI. Normal sclera.  Neck: Supple, Full range of motion, nontender.  Chest/Pulmonary: Clear to ausculation bilaterally, no wheezes or rhonchi.  Cardiovascular: Regular rate and rhythm, no murmur.   Abdomen: Soft, epigastric tenderness, no rebound, guarding, or masses.  Back: No CVA tenderness, nontender midline, no step offs.  Musculoskeletal: No deformity, no edema.  Neuro: Clear speech, appropriate, cooperative, cranial nerves II-XII grossly intact.  Psych: Normal mood and affect.  Skin: No rashes, warm and dry.      DIAGNOSTIC STUDIES / PROCEDURES    LABS & EKG  Results for orders placed or performed during the hospital encounter of 11/18/17   CBC WITH DIFFERENTIAL   Result Value Ref Range    WBC 13.7 (H) 4.8 - 10.8 K/uL    RBC 4.62 4.20 - 5.40 M/uL    Hemoglobin 14.4 12.0 - 16.0 g/dL    Hematocrit 42.4 37.0 - 47.0 %    MCV 91.8 81.4 - 97.8 fL    MCH 31.2 27.0 - 33.0 pg    MCHC 34.0 33.6 - 35.0 g/dL    RDW 45.5 35.9 - 50.0 fL    Platelet Count 330 164 - 446 K/uL    MPV 9.8 9.0 - 12.9 fL    Neutrophils-Polys 72.70 (H) 44.00 - 72.00 %    Lymphocytes 15.40 (L) 22.00 - 41.00 %    Monocytes 10.80 0.00 - 13.40 %    " Eosinophils 0.60 0.00 - 6.90 %    Basophils 0.40 0.00 - 1.80 %    Immature Granulocytes 0.10 0.00 - 0.90 %    Nucleated RBC 0.00 /100 WBC    Neutrophils (Absolute) 9.93 (H) 2.00 - 7.15 K/uL    Lymphs (Absolute) 2.10 1.00 - 4.80 K/uL    Monos (Absolute) 1.48 (H) 0.00 - 0.85 K/uL    Eos (Absolute) 0.08 0.00 - 0.51 K/uL    Baso (Absolute) 0.06 0.00 - 0.12 K/uL    Immature Granulocytes (abs) 0.02 0.00 - 0.11 K/uL    NRBC (Absolute) 0.00 K/uL   COMP METABOLIC PANEL   Result Value Ref Range    Sodium 138 135 - 145 mmol/L    Potassium 3.5 (L) 3.6 - 5.5 mmol/L    Chloride 105 96 - 112 mmol/L    Co2 26 20 - 33 mmol/L    Anion Gap 7.0 0.0 - 11.9    Glucose 107 (H) 65 - 99 mg/dL    Bun 9 8 - 22 mg/dL    Creatinine 0.63 0.50 - 1.40 mg/dL    Calcium 9.1 8.4 - 10.2 mg/dL    AST(SGOT) 405 (H) 12 - 45 U/L    ALT(SGPT) 314 (H) 2 - 50 U/L    Alkaline Phosphatase 92 30 - 99 U/L    Total Bilirubin 1.5 0.1 - 1.5 mg/dL    Albumin 4.0 3.2 - 4.9 g/dL    Total Protein 7.1 6.0 - 8.2 g/dL    Globulin 3.1 1.9 - 3.5 g/dL    A-G Ratio 1.3 g/dL   LIPASE   Result Value Ref Range    Lipase >400 (H) 7 - 58 U/L   ESTIMATED GFR   Result Value Ref Range    GFR If African American >60 >60 mL/min/1.73 m 2    GFR If Non African American >60 >60 mL/min/1.73 m 2   MAGNESIUM   Result Value Ref Range    Magnesium 2.0 1.5 - 2.5 mg/dL   PHOSPHORUS   Result Value Ref Range    Phosphorus 3.5 2.5 - 4.5 mg/dL   APTT   Result Value Ref Range    APTT 29.2 24.7 - 36.0 sec   BLOOD CULTURE   Result Value Ref Range    Significant Indicator NEG     Source BLD     Site PERIPHERAL     Blood Culture       No Growth    Note: Blood cultures are incubated for 5 days and  are monitored continuously.Positive blood cultures  are called to the RN and reported as soon as  they are identified.  Blood culture testing and Gram stain, if indicated, are  performed at [facility], [address] [place] [address].  Positive blood cultures are  sent to  [facility], [address] [place] [address], for organism identification and  susceptibility testing.     BLOOD CULTURE   Result Value Ref Range    Significant Indicator NEG     Source BLD     Site PERIPHERAL     Blood Culture       No Growth    Note: Blood cultures are incubated for 5 days and  are monitored continuously.Positive blood cultures  are called to the RN and reported as soon as  they are identified.  Blood culture testing and Gram stain, if indicated, are  performed at [facility], [address] [place] [address].  Positive blood cultures are  sent to [facility], [address] [place] [address], for organism identification and  susceptibility testing.     PROTHROMBIN TIME   Result Value Ref Range    PT 14.3 12.0 - 14.6 sec    INR 1.13 0.87 - 1.13   CBC WITH DIFFERENTIAL   Result Value Ref Range    WBC 8.4 4.8 - 10.8 K/uL    RBC 3.90 (L) 4.20 - 5.40 M/uL    Hemoglobin 12.1 12.0 - 16.0 g/dL    Hematocrit 36.8 (L) 37.0 - 47.0 %    MCV 94.4 81.4 - 97.8 fL    MCH 31.0 27.0 - 33.0 pg    MCHC 32.9 (L) 33.6 - 35.0 g/dL    RDW 48.0 35.9 - 50.0 fL    Platelet Count 253 164 - 446 K/uL    MPV 10.0 9.0 - 12.9 fL    Neutrophils-Polys 70.60 44.00 - 72.00 %    Lymphocytes 20.20 (L) 22.00 - 41.00 %    Monocytes 8.20 0.00 - 13.40 %    Eosinophils 0.10 0.00 - 6.90 %    Basophils 0.40 0.00 - 1.80 %    Immature Granulocytes 0.50 0.00 - 0.90 %    Nucleated RBC 0.00 /100 WBC    Neutrophils (Absolute) 5.95 2.00 - 7.15 K/uL    Lymphs (Absolute) 1.70 1.00 - 4.80 K/uL    Monos (Absolute) 0.69 0.00 - 0.85 K/uL    Eos (Absolute) 0.01 0.00 - 0.51 K/uL    Baso (Absolute) 0.03 0.00 - 0.12 K/uL    Immature Granulocytes (abs) 0.04 0.00 - 0.11 K/uL    NRBC (Absolute) 0.00 K/uL   COMP METABOLIC PANEL   Result Value Ref Range    Sodium 138 135 - 145 mmol/L    Potassium 4.3 3.6 - 5.5 mmol/L    Chloride 107 96 - 112 mmol/L    Co2 26 20 - 33 mmol/L    Anion Gap 5.0 0.0 - 11.9     Glucose 100 (H) 65 - 99 mg/dL    Bun 7 (L) 8 - 22 mg/dL    Creatinine 0.55 0.50 - 1.40 mg/dL    Calcium 8.7 8.4 - 10.2 mg/dL    AST(SGOT) 213 (H) 12 - 45 U/L    ALT(SGPT) 304 (H) 2 - 50 U/L    Alkaline Phosphatase 91 30 - 99 U/L    Total Bilirubin 1.1 0.1 - 1.5 mg/dL    Albumin 3.5 3.2 - 4.9 g/dL    Total Protein 6.3 6.0 - 8.2 g/dL    Globulin 2.8 1.9 - 3.5 g/dL    A-G Ratio 1.3 g/dL   MAGNESIUM   Result Value Ref Range    Magnesium 1.8 1.5 - 2.5 mg/dL   PHOSPHORUS   Result Value Ref Range    Phosphorus 3.2 2.5 - 4.5 mg/dL   ESTIMATED GFR   Result Value Ref Range    GFR If African American >60 >60 mL/min/1.73 m 2    GFR If Non African American >60 >60 mL/min/1.73 m 2   LIPID PROFILE   Result Value Ref Range    Cholesterol,Tot 106 100 - 199 mg/dL    Triglycerides 19 0 - 149 mg/dL    HDL 57 >=40 mg/dL    LDL 45 <100 mg/dL   POC UA   Result Value Ref Range    POC Color Radha     POC Appearance Clear     POC Glucose Negative Negative mg/dL    POC Ketones Trace (A) Negative mg/dL    POC Specific Gravity 1.025 1.005 - 1.030    POC Blood Negative Negative    POC Urine PH 6.0 5.0 - 8.0    POC Protein 30 (A) Negative mg/dL    POC Nitrites Negative Negative    POC Leukocyte Esterase Small (A) Negative   POC URINE PREGNANCY   Result Value Ref Range    POC Urine Pregnancy Test Negative          RADIOLOGY  [id number] VIEW   Final Result      No acute abdominal findings. Somewhat increased colonic fecal material, suggestive of constipation.      US-LIVER AND BILIARY TREE   Final Result      1.  Interval cholecystectomy      2.  Slight increase in biliary dilation with common bile duct previously measuring 7 mm now 8 mm.         [id number]    (Results Pending)   NM-HEPATOBILIARY SCAN    (Results Pending)       COURSE & MEDICAL DECISION MAKING    This is a 32 y.o. female who presents with epigastric burning abd pain, cholecystectomy 6 weeks ago.    Differential Diagnosis includes but is not limited to:  Postop pain,  gastritis, pancreatitis, biloma, retained stone, adhesion, obstruction    ED Course:  Plan labs, ultrasound reevaluation.  Labs with LFTs elevated, pancreatitis with significantly elevated lipase.  US with CBD dilation.  No fever, no altered mental status, wbc normal, vss, doubt cholangitis. Concern for cbd obstruction, stone vs postop. No e/o biloma at this time. HCG neg doubt pregnancy. No h/o etoh doubt alcoholic pancreatitis.    6:55 PM  D/w hospitalist Dr Herman, will admit.  Concern for possible cbd dilation, merits admit for sx control, npo and bowel rest, rehydration with IV fluids, and MRCP/HIDA in AM.    Pt feeling more comfortable plan continued parenteral symptom control and admit. Pt understands and agrees with plan.    Medications   lactated ringers infusion ( Intravenous New Bag 11/19/17 0518)   metronidazole (FLAGYL) IVPB 500 mg (0 mg Intravenous Stopped 11/19/17 0739)   ciprofloxacin (CIPRO) ivpb 400 mg (0 mg Intravenous Stopped 11/18/17 2330)   senna-docusate (PERICOLACE or SENOKOT S) 8.6-50 MG per tablet 2 Tab (2 Tabs Oral Given 11/18/17 2134)     And   polyethylene glycol/lytes (MIRALAX) PACKET 1 Packet (not administered)     And   magnesium hydroxide (MILK OF MAGNESIA) suspension 30 mL (not administered)     And   bisacodyl (DULCOLAX) suppository 10 mg (not administered)   Respiratory Care per Protocol (not administered)   enoxaparin (LOVENOX) inj 40 mg (not administered)   ondansetron (ZOFRAN) syringe/vial injection 4 mg (not administered)   ondansetron (ZOFRAN ODT) dispertab 4 mg (not administered)   promethazine (PHENERGAN) tablet 12.5-25 mg (25 mg Oral Given 11/18/17 2134)   promethazine (PHENERGAN) suppository 12.5-25 mg (not administered)   acetaminophen (TYLENOL) tablet 650 mg (not administered)   Pharmacy Consult Request ...Pain Management Review (not administered)     And   oxycodone immediate-release (ROXICODONE) tablet 2.5 mg (not administered)     And   oxycodone immediate-release  (ROXICODONE) tablet 5 mg (not administered)     And   morphine (pf) 4 mg/ml injection 2 mg (not administered)   hyoscyamine-maalox plus-lidocaine viscous (GI COCKTAIL) oral susp 30 mL (30 mL Oral Given 11/18/17 1726)   NS infusion 1,590 mL (1,590 mL Intravenous New Bag 11/18/17 1920)   potassium chloride SA (Kdur) tablet 40 mEq (40 mEq Oral Given 11/19/17 0336)   morphine (pf) 4 mg/ml injection 2 mg (2 mg Intravenous Given 11/18/17 1952)       FINAL IMPRESSION  1. Acute pancreatitis, unspecified complication status, unspecified pancreatitis type    2. Transaminitis    3. Generalized abdominal pain    4. Epigastric abdominal pain    5. History of cholecystectomy          -ADMIT-       Results, exam findings, clinical impression, presumed diagnosis, treatment options, and need for admit were discussed with the patient, and they verbalized understanding, agreed with, and appreciated the plan of care.    Pertinent Labs & Imaging studies reviewed and verified by myself, as well as nursing notes and the patient's past medical, family, and social histories (See chart for details).    Electronically signed by Nick Han on 11/19/2017 at 10:11 AM.

## 2017-11-19 NOTE — PROGRESS NOTES
"Med rec updated and complete  Allergies reviewed  Pt states \"No prescription medications or vitamins\".  Pt states \"No antibiotics in the last 30 days\".  Pt states \"I don't know what pharmacy to go too\".    "

## 2017-11-19 NOTE — CARE PLAN
Problem: Knowledge Deficit  Goal: Knowledge of disease process/condition, treatment plan, diagnostic tests, and medications will improve  Outcome: PROGRESSING AS EXPECTED  Educated pt on reason for diagnostic testing. All questions answered.     Problem: Fluid Volume:  Goal: Will maintain balanced intake and output  Outcome: PROGRESSING AS EXPECTED   mL/hr, pt tolerating and voiding appropriately.

## 2017-11-19 NOTE — PROGRESS NOTES
Spoke with Dr. Herman regarding pt's BPs. Pt is not symptomatic, no intervention required, will continue to monitor.

## 2017-11-19 NOTE — H&P
Hospital Medicine History and Physical    Date of Service  11/18/2017    Chief Complaint  Chief Complaint   Patient presents with   • Abdominal Pain       History of Presenting Illness  32 y.o. female who presented 11/18/2017 with Abdominal pain. Patient recently underwent cholecystectomy in the later part of September 2017 with Dr. Schmitt after presentation to the emergency room with abdominal pain and findings of gallstones sludge concerning for acute cholecystitis. Patient had an uneventful postoperative course and patient was discharged home. Postoperatively patient has had 3 episodes of abdominal pain at home. This has been the worst one and patient presents to the emergency room for further evaluation. Patient reports that starting yesterday she had development of abdominal pain. The pain has been constant and worsening. Pain is located in the epigastric area. She characterizes this as a burning pain/hurting pain. It is 7 out of 10 in intensity. It is nonradiating. She reports that initially her pain use to improve with vomiting but since yesterday she has vomited without any improvement in her pain. She has not noticed any worsening symptoms. She denies any association with food. She denies any fever or chills. Reports nausea and vomiting. Vomited 4 times prior to presentation to the hospital. No blood in the vomitus. No diarrhea or other GI symptoms.     Primary Care Physician  No primary care provider on file.    Consultants  None    Code Status  FULL CODE    Review of Systems  Review of Systems   Constitutional: Negative for chills, diaphoresis, fever, malaise/fatigue and weight loss.   HENT: Negative for hearing loss and tinnitus.    Eyes: Negative for blurred vision and double vision.   Respiratory: Negative for cough, hemoptysis, sputum production, shortness of breath and wheezing.    Cardiovascular: Negative for chest pain, palpitations and PND.   Gastrointestinal: Positive for abdominal pain,  nausea and vomiting. Negative for blood in stool, constipation, diarrhea and melena.   Genitourinary: Negative for dysuria, flank pain, frequency, hematuria and urgency.   Musculoskeletal: Negative for back pain, falls, joint pain, myalgias and neck pain.   Skin: Negative for itching and rash.   Neurological: Negative for dizziness, weakness and headaches.   Psychiatric/Behavioral: Negative for depression and suicidal ideas.        Past Medical History  Recent cholecystectomy    Surgical History  Past Surgical History:   Procedure Laterality Date   • CLEMENCIA BY LAPAROSCOPY  2017    Procedure: CLEMENCIA BY LAPAROSCOPY;  Surgeon: Abel Schmitt M.D.;  Location: SURGERY [facility];  Service: General       Medications  No current facility-administered medications on file prior to encounter.      No current outpatient prescriptions on file prior to encounter.       Family History  History reviewed. No pertinent family history.    Social History  Social History   Substance Use Topics   • Smoking status: Never Smoker   • Smokeless tobacco: Never Used   • Alcohol use No       Allergies  No Known Allergies     Physical Exam  Laboratory   Hemodynamics  Temp (24hrs), Av.4 °C (97.5 °F), Min:36.4 °C (97.5 °F), Max:36.4 °C (97.5 °F)   Temperature: 36.4 °C (97.5 °F)  Pulse  Av  Min: 80  Max: 80    Blood Pressure: 110/70      Respiratory      Respiration: 20, Pulse Oximetry: 99 %             Physical Exam   Constitutional: She is oriented to person, place, and time. She appears well-developed and well-nourished. No distress.   HENT:   Head: Normocephalic.   Mouth/Throat: Oropharynx is clear and moist. No oropharyngeal exudate.   Eyes: Conjunctivae are normal. Pupils are equal, round, and reactive to light. No scleral icterus.   Neck: No JVD present.   Cardiovascular: Normal rate, regular rhythm and normal heart sounds.  Exam reveals no gallop and no friction rub.    No murmur heard.  Pulmonary/Chest: Breath sounds  "normal. No stridor. No respiratory distress. She has no wheezes. She has no rales.   Abdominal: Soft. Bowel sounds are normal. She exhibits no distension. There is tenderness (Epigastric area. No peritoneal signs. ). There is no rebound and no guarding.   Musculoskeletal: Normal range of motion. She exhibits no edema, tenderness or deformity.   Neurological: She is alert and oriented to person, place, and time. No cranial nerve deficit.   Skin: Skin is warm and dry. She is not diaphoretic.   Psychiatric: She has a normal mood and affect. Her behavior is normal. Judgment and thought content normal.       Recent Labs      11/18/17   1725   WBC  13.7*   RBC  4.62   HEMOGLOBIN  14.4   HEMATOCRIT  42.4   MCV  91.8   MCH  31.2   MCHC  34.0   RDW  45.5   PLATELETCT  330   MPV  9.8     Recent Labs      11/18/17   1725   SODIUM  138   POTASSIUM  3.5*   CHLORIDE  105   CO2  26   GLUCOSE  107*   BUN  9   CREATININE  0.63   CALCIUM  9.1     Recent Labs      11/18/17   1725   ALTSGPT  314*   ASTSGOT  405*   ALKPHOSPHAT  92   TBILIRUBIN  1.5   LIPASE  >400*   GLUCOSE  107*                 No results found for: TROPONINI  Urinalysis:    Lab Results  Component Value Date/Time   SPECGRAVITY 1.028 09/26/2017 1450   GLUCOSEUR Negative 09/26/2017 1450   KETONES 80 (A) 09/26/2017 1450   NITRITE Negative 09/26/2017 1450   WBCURINE 0-2 09/26/2017 1450   RBCURINE 2-5 (A) 09/26/2017 1450   BACTERIA Few (A) 09/26/2017 1450   EPITHELCELL Many (A) 09/26/2017 1450        Imaging  Reviwed   Assessment/Plan     I anticipate this patient will require at least two midnights for appropriate medical management, necessitating inpatient admission.    Pancreatitis- (present on admission)   Assessment & Plan    S/P recent cholecystectomy 09/26/2017  Surgeon Dr Schmitt  Pathology \"Cholelithiasis. Benign gallbladder with mild acute and chronic inflammation\"  Now presents with Epigastric pain with transaminitis, slight hyperbilir and lipase > 400  Acute " pancreatitis is most likely  Underlying cholangitis cannot be ruled out given leukocytosis  Retained GS cannot be ruled out  Admit to medical RNF  Aggressive IVF hydration  NPO for now  Obtain MRCP to r/o retained GS  Obtained HIDA scan to r/o biliary leak  Consider surgical or GI consultation based on results above  Liberate diet as able  Symptom control of N/V and pain  Empiric ciprofloxacin and flagyl for now        Transaminitis- (present on admission)   Assessment & Plan    Pancreatitis related  Plan as above        Leukocytosis- (present on admission)   Assessment & Plan    Cholangitis cannot be ruled  Empiric ciprofloxacin and flagyl, de escalate as able  Monitor        Hypokalemia- (present on admission)   Assessment & Plan    Replaced.   Monitor            VTE prophylaxis: SCD and SC Lovenox.

## 2017-11-19 NOTE — PROGRESS NOTES
Patient has a HIDA w/CCK ordered. Spoke with Dr. Romero regarding CCK shortage, and at this time we can proceed with a fatty meal. Per Physician, hold off on HIDA for now, will re-evaluate tomorrow am for CCK. VS fatty meal.

## 2017-11-19 NOTE — PROGRESS NOTES
Received report from ER RNDorothea. Patient to the floor at 2045. Family at bedside. Patient came up with NS bolus running via gravity. Set up pump to finish running bolus doses. Patient initially experiencing minimal n/v. Patient opted for PO promethazine instead of suppository. Declined pain med for now. Patient is currently resting with first antibiotic infusion finished and second one starting now (2330).

## 2017-11-20 ENCOUNTER — PATIENT OUTREACH (OUTPATIENT)
Dept: HEALTH INFORMATION MANAGEMENT | Facility: OTHER | Age: 32
End: 2017-11-20

## 2017-11-20 VITALS
BODY MASS INDEX: 20.7 KG/M2 | DIASTOLIC BLOOD PRESSURE: 57 MMHG | OXYGEN SATURATION: 96 % | RESPIRATION RATE: 16 BRPM | HEIGHT: 63 IN | HEART RATE: 63 BPM | TEMPERATURE: 97.6 F | SYSTOLIC BLOOD PRESSURE: 98 MMHG | WEIGHT: 116.84 LBS

## 2017-11-20 LAB
ALBUMIN SERPL BCP-MCNC: 3.3 G/DL (ref 3.2–4.9)
ALBUMIN/GLOB SERPL: 1.2 G/DL
ALP SERPL-CCNC: 77 U/L (ref 30–99)
ALT SERPL-CCNC: 190 U/L (ref 2–50)
ANION GAP SERPL CALC-SCNC: 6 MMOL/L (ref 0–11.9)
AST SERPL-CCNC: 70 U/L (ref 12–45)
BASOPHILS # BLD AUTO: 0.6 % (ref 0–1.8)
BASOPHILS # BLD: 0.03 K/UL (ref 0–0.12)
BILIRUB SERPL-MCNC: 0.6 MG/DL (ref 0.1–1.5)
BUN SERPL-MCNC: 6 MG/DL (ref 8–22)
CALCIUM SERPL-MCNC: 8.5 MG/DL (ref 8.4–10.2)
CHLORIDE SERPL-SCNC: 106 MMOL/L (ref 96–112)
CO2 SERPL-SCNC: 25 MMOL/L (ref 20–33)
CREAT SERPL-MCNC: 0.5 MG/DL (ref 0.5–1.4)
EOSINOPHIL # BLD AUTO: 0.09 K/UL (ref 0–0.51)
EOSINOPHIL NFR BLD: 1.7 % (ref 0–6.9)
ERYTHROCYTE [DISTWIDTH] IN BLOOD BY AUTOMATED COUNT: 48.5 FL (ref 35.9–50)
GFR SERPL CREATININE-BSD FRML MDRD: >60 ML/MIN/1.73 M 2
GLOBULIN SER CALC-MCNC: 2.7 G/DL (ref 1.9–3.5)
GLUCOSE SERPL-MCNC: 93 MG/DL (ref 65–99)
HCT VFR BLD AUTO: 35.5 % (ref 37–47)
HGB BLD-MCNC: 11.6 G/DL (ref 12–16)
IMM GRANULOCYTES # BLD AUTO: 0.01 K/UL (ref 0–0.11)
IMM GRANULOCYTES NFR BLD AUTO: 0.2 % (ref 0–0.9)
LIPASE SERPL-CCNC: >400 U/L (ref 7–58)
LYMPHOCYTES # BLD AUTO: 1.89 K/UL (ref 1–4.8)
LYMPHOCYTES NFR BLD: 34.7 % (ref 22–41)
MCH RBC QN AUTO: 30.9 PG (ref 27–33)
MCHC RBC AUTO-ENTMCNC: 32.7 G/DL (ref 33.6–35)
MCV RBC AUTO: 94.7 FL (ref 81.4–97.8)
MONOCYTES # BLD AUTO: 0.58 K/UL (ref 0–0.85)
MONOCYTES NFR BLD AUTO: 10.7 % (ref 0–13.4)
NEUTROPHILS # BLD AUTO: 2.84 K/UL (ref 2–7.15)
NEUTROPHILS NFR BLD: 52.1 % (ref 44–72)
NRBC # BLD AUTO: 0 K/UL
NRBC BLD AUTO-RTO: 0 /100 WBC
PLATELET # BLD AUTO: 256 K/UL (ref 164–446)
PMV BLD AUTO: 10 FL (ref 9–12.9)
POTASSIUM SERPL-SCNC: 3.8 MMOL/L (ref 3.6–5.5)
PROT SERPL-MCNC: 6 G/DL (ref 6–8.2)
RBC # BLD AUTO: 3.75 M/UL (ref 4.2–5.4)
SODIUM SERPL-SCNC: 137 MMOL/L (ref 135–145)
WBC # BLD AUTO: 5.4 K/UL (ref 4.8–10.8)

## 2017-11-20 PROCEDURE — 99239 HOSP IP/OBS DSCHRG MGMT >30: CPT | Performed by: FAMILY MEDICINE

## 2017-11-20 PROCEDURE — 85025 COMPLETE CBC W/AUTO DIFF WBC: CPT

## 2017-11-20 PROCEDURE — 700101 HCHG RX REV CODE 250: Performed by: INTERNAL MEDICINE

## 2017-11-20 PROCEDURE — 700105 HCHG RX REV CODE 258: Performed by: FAMILY MEDICINE

## 2017-11-20 PROCEDURE — 83690 ASSAY OF LIPASE: CPT

## 2017-11-20 PROCEDURE — 80053 COMPREHEN METABOLIC PANEL: CPT

## 2017-11-20 PROCEDURE — 36415 COLL VENOUS BLD VENIPUNCTURE: CPT

## 2017-11-20 PROCEDURE — 700111 HCHG RX REV CODE 636 W/ 250 OVERRIDE (IP): Performed by: INTERNAL MEDICINE

## 2017-11-20 RX ADMIN — METRONIDAZOLE 500 MG: 500 INJECTION, SOLUTION INTRAVENOUS at 06:08

## 2017-11-20 RX ADMIN — CIPROFLOXACIN 400 MG: 2 INJECTION, SOLUTION INTRAVENOUS at 08:45

## 2017-11-20 RX ADMIN — SODIUM CHLORIDE, POTASSIUM CHLORIDE, SODIUM LACTATE AND CALCIUM CHLORIDE: 600; 310; 30; 20 INJECTION, SOLUTION INTRAVENOUS at 06:10

## 2017-11-20 ASSESSMENT — LIFESTYLE VARIABLES
EVER_SMOKED: NEVER
ALCOHOL_USE: NO

## 2017-11-20 ASSESSMENT — PAIN SCALES - GENERAL: PAINLEVEL_OUTOF10: 0

## 2017-11-20 NOTE — DISCHARGE INSTRUCTIONS
Discharge Instructions    Discharged to home by car with relative. Discharged via walking, hospital escort: Yes.  Special equipment needed: Not Applicable    Be sure to schedule a follow-up appointment with your primary care doctor or any specialists as instructed.     Discharge Plan:   Influenza Vaccine Indication: Indicated: 9 to 64 years of age    I understand that a diet low in cholesterol, fat, and sodium is recommended for good health. Unless I have been given specific instructions below for another diet, I accept this instruction as my diet prescription.   Other diet: Low fat diet as tolerated    Special Instructions: None    · Is patient discharged on Warfarin / Coumadin?   No     · Is patient Post Blood Transfusion?  No    Depression / Suicide Risk    As you are discharged from this [facility] facility, it is important to learn how to keep safe from harming yourself.    Recognize the warning signs:  · Abrupt changes in personality, positive or negative- including increase in energy   · Giving away possessions  · Change in eating patterns- significant weight changes-  positive or negative  · Change in sleeping patterns- unable to sleep or sleeping all the time   · Unwillingness or inability to communicate  · Depression  · Unusual sadness, discouragement and loneliness  · Talk of wanting to die  · Neglect of personal appearance   · Rebelliousness- reckless behavior  · Withdrawal from people/activities they love  · Confusion- inability to concentrate     If you or a loved one observes any of these behaviors or has concerns about self-harm, here's what you can do:  · Talk about it- your feelings and reasons for harming yourself  · Remove any means that you might use to hurt yourself (examples: pills, rope, extension cords, firearm)  · Get professional help from the community (Mental Health, Substance Abuse, psychological counseling)  · Do not be alone:Call your Safe Contact- someone whom you trust who will  be there for you.  · Call your local CRISIS HOTLINE 070-0929 or 174-155-9501  · Call your local Children's Mobile Crisis Response Team [place] (852) 965-6678 or www.[employer]  · Call the toll free National Suicide Prevention Hotlines   · National Suicide Prevention Lifeline 029-868-ITFH (4607)  · National Ploonge Line Network 800-SUICIDE (192-0330)

## 2017-11-20 NOTE — DISCHARGE SUMMARY
CHIEF COMPLAINT ON ADMISSION  Chief Complaint   Patient presents with   • Abdominal Pain       CODE STATUS  Full Code    HPI & HOSPITAL COURSE  This is a 32 y.o. female here with Normal pain and was found to have acute pancreatitis. Ultrasound of the abdomen showed slight increase in biliary dilatation with CBD measuring 8 mm. The visualized pancreas was unremarkable. Her lipase was elevated at more than 400. Was started on IV fluids. HIDA scan was done and did not show any evidence of biliary obstruction or bile leak. Patient responded to medical management very well. She was clinically and vitally stable. Her abdominal pain resolved completely. Her liver enzymes trended down to close to normal. She was afebrile. Tolerate by mouth intake very well. Ambulatory. Repeat lipase was still elevated however, clinically the patient was doing much better than when she was admitted. Based on the clinical picture and the clinical judgment which sure the patient home in stable condition.    Therefore, she is discharged in good and stable condition with close outpatient follow-up.    SPECIFIC OUTPATIENT FOLLOW-UP  PCP in 1 week.     DISCHARGE PROBLEM LIST  Active Problems:    Pancreatitis POA: Yes    Leukocytosis POA: Yes    Transaminitis POA: Yes    Hypokalemia POA: Yes  Resolved Problems:    * No resolved hospital problems. *      FOLLOW UP  No future appointments.  Cecilia Mascorro P.A.-C.  [address]  [place] [address]  [phone]  Go on 12/4/2017  Please arrive at 9:00 am for a 9:30 am appointment. This is to establish with a primary care provider. If you would like to receive a discounted price please bring; photo ID, proof of address, and 2 months of pay stubs. Thank you.      MEDICATIONS ON DISCHARGE  There are no discharge medications for this patient.       DIET  Orders Placed This Encounter   Procedures   • DIET ORDER     Standing Status:   Standing     Number of Occurrences:   1      Order Specific Question:   Diet:     Answer:   Low Fiber(GI Soft) [2]     Order Specific Question:   Macronutrient modifications:     Answer:   Low Fat [5]       ACTIVITY  As tolerated.  Weight bearing as tolerated      CONSULTATIONS  None     PROCEDURES  HIDA scan was done on 11/19/17 and showed prior cholecystectomy with no evidence of biliary obstruction or bile leak.    LABORATORY  Lab Results   Component Value Date/Time    SODIUM 137 11/20/2017 04:59 AM    POTASSIUM 3.8 11/20/2017 04:59 AM    CHLORIDE 106 11/20/2017 04:59 AM    CO2 25 11/20/2017 04:59 AM    GLUCOSE 93 11/20/2017 04:59 AM    BUN 6 (L) 11/20/2017 04:59 AM    CREATININE 0.50 11/20/2017 04:59 AM        Lab Results   Component Value Date/Time    WBC 5.4 11/20/2017 04:59 AM    HEMOGLOBIN 11.6 (L) 11/20/2017 04:59 AM    HEMATOCRIT 35.5 (L) 11/20/2017 04:59 AM    PLATELETCT 256 11/20/2017 04:59 AM        Total time of the discharge process exceeds 32 minutes

## 2017-11-20 NOTE — PROGRESS NOTES
Discharging pt home per MD order. Discussed discharge instructions and follow up appointments. Pt voiding and ambulating without difficulty, pain controlled, tolerating diet. Family at bedside, all questions answered. Pt discharged off unit with hospital escort.

## 2017-11-20 NOTE — PROGRESS NOTES
Received report from day shift nurse. Assumed pt care at 1915. Pt is A&Ox4, resting comfortably in bed. Pt on r.a. No signs of SOB/respiratory distress noted. Labs noted, VSS. Pt denied pain. Will returns for night time meds and assessment. Fall precautions in place. Bed locked & at lowest position. Call light and personal belongings within reach; encouraged pt to call for any assistance. Continue to monitor

## 2017-11-20 NOTE — PROGRESS NOTES
Report received from Thu RN. Assumed care of pt. Pt resting in bed. Pt A&Ox4. POC discussed. Pt verbalized understanding. No signs of distress or discomfort noted at this time. Pt instructed to use call light for assistance. Call light and personal belongings within reach. Pt denies any concerns at this time. All questions answered. Safety measures in place. Will continue to monitor.

## 2017-11-23 LAB
BACTERIA BLD CULT: NORMAL
BACTERIA BLD CULT: NORMAL
SIGNIFICANT IND 70042: NORMAL
SIGNIFICANT IND 70042: NORMAL
SITE SITE: NORMAL
SITE SITE: NORMAL
SOURCE SOURCE: NORMAL
SOURCE SOURCE: NORMAL

## 2018-05-21 ENCOUNTER — NON-PROVIDER VISIT (OUTPATIENT)
Dept: OBGYN | Facility: CLINIC | Age: 33
End: 2018-05-21

## 2018-05-21 DIAGNOSIS — Z32.01 PREGNANCY EXAMINATION OR TEST, POSITIVE RESULT: ICD-10-CM

## 2018-05-21 LAB
INT CON NEG: NEGATIVE
INT CON POS: POSITIVE
POC URINE PREGNANCY TEST: POSITIVE

## 2018-05-21 PROCEDURE — 81025 URINE PREGNANCY TEST: CPT | Performed by: OBSTETRICS & GYNECOLOGY

## 2018-05-22 ENCOUNTER — APPOINTMENT (OUTPATIENT)
Dept: OBGYN | Facility: CLINIC | Age: 33
End: 2018-05-22

## 2018-05-25 ENCOUNTER — HOSPITAL ENCOUNTER (EMERGENCY)
Facility: MEDICAL CENTER | Age: 33
End: 2018-05-26
Attending: EMERGENCY MEDICINE

## 2018-05-25 ENCOUNTER — APPOINTMENT (OUTPATIENT)
Dept: RADIOLOGY | Facility: MEDICAL CENTER | Age: 33
End: 2018-05-25
Attending: EMERGENCY MEDICINE

## 2018-05-25 DIAGNOSIS — R10.2 PELVIC PAIN: ICD-10-CM

## 2018-05-25 DIAGNOSIS — O20.0 ABORTION, THREATENED: ICD-10-CM

## 2018-05-25 LAB
ALBUMIN SERPL BCP-MCNC: 4.4 G/DL (ref 3.2–4.9)
ALBUMIN/GLOB SERPL: 1.3 G/DL
ALP SERPL-CCNC: 54 U/L (ref 30–99)
ALT SERPL-CCNC: 15 U/L (ref 2–50)
ANION GAP SERPL CALC-SCNC: 9 MMOL/L (ref 0–11.9)
APPEARANCE UR: CLEAR
AST SERPL-CCNC: 14 U/L (ref 12–45)
B-HCG SERPL-ACNC: ABNORMAL MIU/ML (ref 0–5)
BACTERIA #/AREA URNS HPF: NEGATIVE /HPF
BASOPHILS # BLD AUTO: 0.7 % (ref 0–1.8)
BASOPHILS # BLD: 0.07 K/UL (ref 0–0.12)
BILIRUB SERPL-MCNC: 0.4 MG/DL (ref 0.1–1.5)
BILIRUB UR QL STRIP.AUTO: NEGATIVE
BUN SERPL-MCNC: 12 MG/DL (ref 8–22)
CALCIUM SERPL-MCNC: 9 MG/DL (ref 8.5–10.5)
CHLORIDE SERPL-SCNC: 103 MMOL/L (ref 96–112)
CO2 SERPL-SCNC: 23 MMOL/L (ref 20–33)
COLOR UR: YELLOW
CREAT SERPL-MCNC: 0.72 MG/DL (ref 0.5–1.4)
EOSINOPHIL # BLD AUTO: 0.05 K/UL (ref 0–0.51)
EOSINOPHIL NFR BLD: 0.5 % (ref 0–6.9)
EPI CELLS #/AREA URNS HPF: ABNORMAL /HPF
ERYTHROCYTE [DISTWIDTH] IN BLOOD BY AUTOMATED COUNT: 44.1 FL (ref 35.9–50)
GLOBULIN SER CALC-MCNC: 3.3 G/DL (ref 1.9–3.5)
GLUCOSE SERPL-MCNC: 92 MG/DL (ref 65–99)
GLUCOSE UR STRIP.AUTO-MCNC: NEGATIVE MG/DL
HCG UR QL: POSITIVE
HCT VFR BLD AUTO: 42.4 % (ref 37–47)
HGB BLD-MCNC: 14 G/DL (ref 12–16)
HYALINE CASTS #/AREA URNS LPF: ABNORMAL /LPF
IMM GRANULOCYTES # BLD AUTO: 0.02 K/UL (ref 0–0.11)
IMM GRANULOCYTES NFR BLD AUTO: 0.2 % (ref 0–0.9)
KETONES UR STRIP.AUTO-MCNC: NEGATIVE MG/DL
LEUKOCYTE ESTERASE UR QL STRIP.AUTO: ABNORMAL
LIPASE SERPL-CCNC: 25 U/L (ref 11–82)
LYMPHOCYTES # BLD AUTO: 2.47 K/UL (ref 1–4.8)
LYMPHOCYTES NFR BLD: 25.2 % (ref 22–41)
MCH RBC QN AUTO: 30.9 PG (ref 27–33)
MCHC RBC AUTO-ENTMCNC: 33 G/DL (ref 33.6–35)
MCV RBC AUTO: 93.6 FL (ref 81.4–97.8)
MICRO URNS: ABNORMAL
MONOCYTES # BLD AUTO: 1.08 K/UL (ref 0–0.85)
MONOCYTES NFR BLD AUTO: 11 % (ref 0–13.4)
NEUTROPHILS # BLD AUTO: 6.1 K/UL (ref 2–7.15)
NEUTROPHILS NFR BLD: 62.4 % (ref 44–72)
NITRITE UR QL STRIP.AUTO: NEGATIVE
NRBC # BLD AUTO: 0 K/UL
NRBC BLD-RTO: 0 /100 WBC
NUMBER OF RH DOSES IND 8505RD: NORMAL
PH UR STRIP.AUTO: 6.5 [PH]
PLATELET # BLD AUTO: 341 K/UL (ref 164–446)
PMV BLD AUTO: 9.6 FL (ref 9–12.9)
POTASSIUM SERPL-SCNC: 3.4 MMOL/L (ref 3.6–5.5)
PROT SERPL-MCNC: 7.7 G/DL (ref 6–8.2)
PROT UR QL STRIP: NEGATIVE MG/DL
RBC # BLD AUTO: 4.53 M/UL (ref 4.2–5.4)
RBC # URNS HPF: ABNORMAL /HPF
RBC UR QL AUTO: NEGATIVE
RH BLD: NORMAL
SODIUM SERPL-SCNC: 135 MMOL/L (ref 135–145)
SP GR UR REFRACTOMETRY: 1.03
SP GR UR STRIP.AUTO: 1.02
UROBILINOGEN UR STRIP.AUTO-MCNC: 0.2 MG/DL
WBC # BLD AUTO: 9.8 K/UL (ref 4.8–10.8)
WBC #/AREA URNS HPF: ABNORMAL /HPF

## 2018-05-25 PROCEDURE — 99284 EMERGENCY DEPT VISIT MOD MDM: CPT

## 2018-05-25 PROCEDURE — 81025 URINE PREGNANCY TEST: CPT

## 2018-05-25 PROCEDURE — 83690 ASSAY OF LIPASE: CPT

## 2018-05-25 PROCEDURE — 36415 COLL VENOUS BLD VENIPUNCTURE: CPT

## 2018-05-25 PROCEDURE — 76817 TRANSVAGINAL US OBSTETRIC: CPT

## 2018-05-25 PROCEDURE — 84702 CHORIONIC GONADOTROPIN TEST: CPT

## 2018-05-25 PROCEDURE — 80053 COMPREHEN METABOLIC PANEL: CPT

## 2018-05-25 PROCEDURE — 85025 COMPLETE CBC W/AUTO DIFF WBC: CPT

## 2018-05-25 PROCEDURE — 81001 URINALYSIS AUTO W/SCOPE: CPT

## 2018-05-25 PROCEDURE — 86901 BLOOD TYPING SEROLOGIC RH(D): CPT

## 2018-05-25 ASSESSMENT — PAIN SCALES - GENERAL: PAINLEVEL_OUTOF10: 7

## 2018-05-26 VITALS
RESPIRATION RATE: 18 BRPM | OXYGEN SATURATION: 97 % | DIASTOLIC BLOOD PRESSURE: 66 MMHG | WEIGHT: 128.31 LBS | HEIGHT: 64 IN | SYSTOLIC BLOOD PRESSURE: 109 MMHG | TEMPERATURE: 97.9 F | BODY MASS INDEX: 21.91 KG/M2 | HEART RATE: 70 BPM

## 2018-05-26 NOTE — ED NOTES
Pt c/o intermittent lower abdominal pain x3 wks. Pt stated she thought it was cramping because her period wad due to start. Pt took home preg test which was +. Pt states period is irregular, but last period was beginning of April. Pt denies any vaginal bleeding/discharge.

## 2018-05-26 NOTE — DISCHARGE INSTRUCTIONS
"Amenaza de aborto  (Threatened Miscarriage)  La amenaza de aborto se produce cuando hay hemorragia vaginal megan las primeras 20 semanas de embarazo, javon el embarazo no se interrumpe. Si megan gonzalo período usted tiene hemorragia vaginal, el médico le hará pruebas para asegurarse de que el embarazo continúe. Si las pruebas muestran que usted continúa embarazada y que el \"bebé\" en desarrollo (feto) dentro del útero sigue creciendo, se considera que tuvo alfred amenaza de aborto.  La amenaza de aborto no implica que el embarazo vaya a terminar, javon sí aumenta el riesgo de perder el embarazo (aborto completo).  CAUSAS  Por lo general, no se conoce la causa de la amenaza de aborto. Si el resultado final es el aborto completo, la causa más frecuente es la cantidad anormal de cromosomas del feto. Los cromosomas son las estructuras internas de las células que contienen todo el material genético.  Algunas de las causas de hemorragia vaginal que no ocasionan un aborto incluyen:  · Las relaciones sexuales.  · Las infecciones.  · Los cambios hormonales normales megan el embarazo.  · La hemorragia que se produce cuando el óvulo se implanta en el útero.  FACTORES DE RIESGO  Los factores de riesgo de hemorragia al principio del embarazo incluyen:  · Obesidad.  · Fumar.  · El consumo de cantidades excesivas de alcohol o cafeína.  · El consumo de drogas.  SIGNOS Y SÍNTOMAS  · Hemorragia vaginal leve.  · Dolor o cólicos abdominales leves.  DIAGNÓSTICO  Si tiene hemorragia con o sin dolor abdominal antes de las 20 semanas de embarazo, el médico le hará pruebas para determinar si el embarazo continúa. Alfred prueba importante incluye el uso de ondas sonoras y de alfred computadora (ecografía) para crear imágenes del interior del útero. Otras pruebas incluyen el examen interno de la vagina y el útero (examen pélvico), y el control de la frecuencia cardíaca del feto.  Es posible que le diagnostiquen alfred amenaza de aborto en los siguientes " casos:  · La ecografía muestra que el embarazo continúa.  · La frecuencia cardíaca del feto es shaan.  · El examen pélvico muestra que la apertura entre el útero y la vagina (jacob del útero) está cerrada.  · Menon frecuencia cardíaca y menon presión arterial están estables.  · Los análisis de ede confirman que el embarazo continúa.  TRATAMIENTO  No se ha demostrado que ningún tratamiento evite que alfred amenaza de aborto se convierta en un aborto completo. Sin embargo, los cuidados adecuados en el hogar son importantes.  INSTRUCCIONES PARA EL CUIDADO EN EL HOGAR  · Asegúrese de asistir a todas las citas de cuidados prenatales. [place] es muy importante.  · Descanse lo suficiente.  · No tenga relaciones sexuales ni use tampones si tiene hemorragia vaginal.  · No se blayne duchas vaginales.  · No fume ni consuma drogas.  · No junaid alcohol.  · Evite la cafeína.  SOLICITE ATENCIÓN MÉDICA SI:  · Tiene alfred ligera hemorragia o manchado vaginal megan el embarazo.  · Tiene dolor o cólicos en el abdomen.  · Tiene fiebre.  SOLICITE ATENCIÓN MÉDICA DE INMEDIATO SI:  · Tiene alfred hemorragia vaginal abundante.  · Elimina coágulos de ede por la vagina.  · Siente dolor en la parte baja de la espalda o cólicos abdominales intensos.  · Tiene fiebre, escalofríos y dolor abdominal intenso.  ASEGÚRESE DE QUE:  · Comprende estas instrucciones.  · Controlará menon afección.  · Recibirá ayuda de inmediato si no mejora o si empeora.  Esta información no tiene lesli fin reemplazar el consejo del médico. Asegúrese de hacerle al médico cualquier pregunta que tenga.  Document Released: 09/27/2006 Document Revised: 12/23/2014 Document Reviewed: 10/14/2014  [employer] Interactive Patient Education © 2017 [employer]      Dolor pélvico en la iris  (Pelvic Pain, Female)  El dolor pélvico se percibe en la parte baja del abdomen, por debajo del ombligo y entre las caderas. El dolor puede comenzar de forma repentina (marco a), reaparecer (recurrente) o durar  mucho tiempo (crónico). Se considera que el dolor pélvico que dura más de seis meses es crónico.  El dolor pélvico puede afectar lo siguiente:  · Los órganos genitales.  · El sistema urinario.  · El tubo digestivo.  · El sistema musculoesquelético.  El dolor pélvico tiene muchas causas posibles. A veces, puede ser el resultado de afecciones digestivas o urinarias, distensiones de músculos o ligamentos, o incluso trastornos genitales. A veces, la causa no se conoce.  INSTRUCCIONES PARA EL CUIDADO EN EL HOGAR  · [place] los medicamentos de venta galne y los recetados solamente lesli se lo haya indicado el médico.  · Diego reposo lesli se lo haya indicado el médico.  · No tenga relaciones sexuales si le causan dolor.  · Lleve un registro del dolor pélvico. Escriba los siguientes datos:  ¨ Cuándo comenzó el dolor.  ¨ La ubicación del dolor.  ¨ Qué cosas parecen aliviar o intensificar el dolor, lesli los alimentos o la menstruación.  ¨ Los síntomas que tiene junto con el dolor.  · Concurra a todas las visitas de control lesli se lo haya indicado el médico. [place] es importante.  SOLICITE ATENCIÓN MÉDICA SI:  · Los medicamentos no le alivian el dolor.  · El dolor reaparece.  · Aparecen nuevos síntomas.  · Tiene secreción o sangrado vaginal anormal, incluido sangrado después de la menopausia.  · Tiene fiebre o siente escalofríos.  · Tiene estreñimiento.  · Observa ede en la orina o en la materia fecal.  · La orina tiene mal olor.  · Se siente mareada o débil.  SOLICITE ATENCIÓN MÉDICA DE INMEDIATO SI:  · Siente dolor intenso repentinamente.  · El dolor es cada vez más intenso.  · Tiene dolor intenso junto con fiebre, náuseas, vómitos o exceso de sudoración.  · Pierde la conciencia.  Esta información no tiene lesli fin reemplazar el consejo del médico. Asegúrese de hacerle al médico cualquier pregunta que tenga.  Document Released: 03/16/2010 Document Revised: 05/03/2016 Document Reviewed: 10/07/2016  [place] Interactive Patient  Education © 2017 [employer] Inc.

## 2018-05-26 NOTE — ED PROVIDER NOTES
"ED Provider Note    ED Provider Note      Primary care provider: Pcp Pt States None    CHIEF COMPLAINT  Chief Complaint   Patient presents with   • Pelvic Pain     X 3wk. Pt reports an intermitent 7/10 \"colic\" like pain       HPI  Michelle Adame is a 33 y.o.  female who presents to the Emergency Department with chief complaint of pelvic pain.  Patient's been experiencing this pain intermittently over the last 3 weeks.  She states the pain is colicky currently at a 7 out of 10 no alleviating or aggravating factors.  Was recently found to be pregnant has been seen in the pregnancy center.  Patient denies vaginal bleeding she denies any abnormal discharge she has had no dysuria no urinary hesitancy urgency no constipation no diarrhea no melena no hematochezia minor nausea 1-2 episodes of emesis no fevers or chills no headache altered mental status cough congestion chest pain or shortness of breath.      REVIEW OF SYSTEMS  10 systems reviewed and otherwise negative, pertinent positives and negatives listed in the history of present illness.    PAST MEDICAL HISTORY   Previous cholecystitis previous pancreatitis    SURGICAL HISTORY   has a past surgical history that includes amelie by laparoscopy (2017).    SOCIAL HISTORY  Social History   Substance Use Topics   • Smoking status: Never Smoker   • Smokeless tobacco: Never Used   • Alcohol use No      History   Drug Use No       FAMILY HISTORY  Non-Contributory    CURRENT MEDICATIONS  Home Medications    **Home medications have not yet been reviewed for this encounter**         ALLERGIES  No Known Allergies    PHYSICAL EXAM  VITAL SIGNS: /66   Pulse 73   Temp 36.6 °C (97.9 °F)   Resp 18   Ht 1.626 m (5' 4\")   Wt 58.2 kg (128 lb 4.9 oz)   LMP 2018   SpO2 99%   BMI 22.02 kg/m²   Pulse ox interpretation: I interpret this pulse ox as normal.  Constitutional: Alert and oriented x 3, minimal distress  HEENT: Atraumatic normocephalic, " pupils are equal round reactive to light extraocular movements are intact. The nares is clear, external ears are normal, mouth shows moist mucous membranes  Neck: Supple, no JVD no tracheal deviation  Cardiovascular: Regular rate and rhythm no murmur rub or gallop 2+ pulses peripherally x4  Thorax & Lungs: No respiratory distress, no wheezes rales or rhonchi, No chest tenderness.   GI: Slight tenderness to palpation in the suprapubic and bilateral lower quadrants no rebound no guarding no peritoneal signs positive bowel sounds nondistended  Skin: Warm dry no acute rash or lesion  Musculoskeletal: Moving all extremities with full range and 5 of 5 strength, no acute  deformity  Neurologic: Cranial nerves III through XII are grossly intact, no sensory deficit, no cerebellar dysfunction   Psychiatric: Appropriate affect for situation at this time      DIAGNOSTIC STUDIES / PROCEDURES  LABS      Results for orders placed or performed during the hospital encounter of 05/25/18   CBC WITH DIFFERENTIAL   Result Value Ref Range    WBC 9.8 4.8 - 10.8 K/uL    RBC 4.53 4.20 - 5.40 M/uL    Hemoglobin 14.0 12.0 - 16.0 g/dL    Hematocrit 42.4 37.0 - 47.0 %    MCV 93.6 81.4 - 97.8 fL    MCH 30.9 27.0 - 33.0 pg    MCHC 33.0 (L) 33.6 - 35.0 g/dL    RDW 44.1 35.9 - 50.0 fL    Platelet Count 341 164 - 446 K/uL    MPV 9.6 9.0 - 12.9 fL    Neutrophils-Polys 62.40 44.00 - 72.00 %    Lymphocytes 25.20 22.00 - 41.00 %    Monocytes 11.00 0.00 - 13.40 %    Eosinophils 0.50 0.00 - 6.90 %    Basophils 0.70 0.00 - 1.80 %    Immature Granulocytes 0.20 0.00 - 0.90 %    Nucleated RBC 0.00 /100 WBC    Neutrophils (Absolute) 6.10 2.00 - 7.15 K/uL    Lymphs (Absolute) 2.47 1.00 - 4.80 K/uL    Monos (Absolute) 1.08 (H) 0.00 - 0.85 K/uL    Eos (Absolute) 0.05 0.00 - 0.51 K/uL    Baso (Absolute) 0.07 0.00 - 0.12 K/uL    Immature Granulocytes (abs) 0.02 0.00 - 0.11 K/uL    NRBC (Absolute) 0.00 K/uL   COMP METABOLIC PANEL   Result Value Ref Range    Sodium  135 135 - 145 mmol/L    Potassium 3.4 (L) 3.6 - 5.5 mmol/L    Chloride 103 96 - 112 mmol/L    Co2 23 20 - 33 mmol/L    Anion Gap 9.0 0.0 - 11.9    Glucose 92 65 - 99 mg/dL    Bun 12 8 - 22 mg/dL    Creatinine 0.72 0.50 - 1.40 mg/dL    Calcium 9.0 8.5 - 10.5 mg/dL    AST(SGOT) 14 12 - 45 U/L    ALT(SGPT) 15 2 - 50 U/L    Alkaline Phosphatase 54 30 - 99 U/L    Total Bilirubin 0.4 0.1 - 1.5 mg/dL    Albumin 4.4 3.2 - 4.9 g/dL    Total Protein 7.7 6.0 - 8.2 g/dL    Globulin 3.3 1.9 - 3.5 g/dL    A-G Ratio 1.3 g/dL   LIPASE   Result Value Ref Range    Lipase 25 11 - 82 U/L   URINALYSIS,CULTURE IF INDICATED   Result Value Ref Range    Color Yellow     Character Clear     Specific Gravity 1.025 <1.035    Ph 6.5 5.0 - 8.0    Glucose Negative Negative mg/dL    Ketones Negative Negative mg/dL    Protein Negative Negative mg/dL    Bilirubin Negative Negative    Urobilinogen, Urine 0.2 Negative    Nitrite Negative Negative    Leukocyte Esterase Small (A) Negative    Occult Blood Negative Negative    Micro Urine Req Microscopic    BETA-HCG QUALITATIVE URINE   Result Value Ref Range    Beta-Hcg Urine Positive (A) Negative   REFRACTOMETER SG   Result Value Ref Range    Specific Gravity 1.026    URINE MICROSCOPIC (W/UA)   Result Value Ref Range    WBC 0-2 /hpf    RBC 2-5 (A) /hpf    Bacteria Negative None /hpf    Epithelial Cells Few /hpf    Hyaline Cast 0-2 /lpf   HCG QUANTITATIVE   Result Value Ref Range    Bhcg 64838.7 (H) 0.0 - 5.0 mIU/mL   RH TYPE FOR RHOGAM FROM E.D.   Result Value Ref Range    Emergency Department Rh Typing POS     Number Of Rh Doses Indicated ZERO    ESTIMATED GFR   Result Value Ref Range    GFR If African American >60 >60 mL/min/1.73 m 2    GFR If Non African American >60 >60 mL/min/1.73 m 2       All labs reviewed by me.      RADIOLOGY  US-OB PELVIS TRANSVAGINAL   Final Result               1. Intrauterine gestational sac of approximately 6 weeks 1 day size with yolk sac but no embryo identified. The  "findings could relate to probable early intrauterine pregnancy.      Continued follow-up of serum beta HCG levels and repeat ultrasound is recommended.                 The radiologist's interpretation of all radiological studies have been reviewed by me.    COURSE & MEDICAL DECISION MAKING  Pertinent Labs & Imaging studies reviewed. (See chart for details)    9:30 PM - Patient seen and examined at bedside.       Patient noted to have slightly elevated blood pressure likely circumstantial secondary to presenting complaint. Referred to primary care physician for further evaluation.        Medical Decision Making: Rh+ beta-hCG approximately 22,000.  Transvaginal ultrasound shows gestational sac without identifiable embryo or fetal heart tones.  Likely early pregnancy approximately 6 weeks 1 day.  Patient previously seen in the pregnancy center and discussed case with Dr. Berrios, who stated that since patient been seen previously in pregnancy center she can follow-up there.  Such pain is improved at this time without intervention.  Patient was instructed to return here in 2 days for repeat ultrasound and beta-hCG should she not be able to be seen in the time frame return here sooner for worsening pain abnormal bleeding discharge any other acute symptoms or concerns otherwise discharged in stable and improved condition.    /66   Pulse 63   Temp 36.6 °C (97.9 °F)   Resp 18   Ht 1.626 m (5' 4\")   Wt 58.2 kg (128 lb 4.9 oz)   LMP 2018   SpO2 100%   BMI 22.02 kg/m²     [facility]  [address] [address] [phone]  In 2 days      [facility], Emergency Dept  [address] [facility] [address] [phone]  In 2 days  If unable to be seen in the pregnancy center or if symptoms worsen      FINAL IMPRESSION  1. Pelvic pain    2. , threatened        This dictation has been created using voice recognition software and/or scribes. The accuracy " of the dictation is limited by the abilities of the software and the expertise of the scribes. I expect there may be some errors of grammar and possibly content. I made every attempt to manually correct the errors within my dictation. However, errors related to voice recognition software and/or scribes may still exist and should be interpreted within the appropriate context.

## 2018-05-26 NOTE — ED TRIAGE NOTES
"Michelle Adame  33 y.o. female  Chief Complaint   Patient presents with   • Pelvic Pain     X 3wk. Pt reports an intermitent 7/10 \"colic\" like pain       Pt amb to triage with steady gait for above complaint. Pt is currently pregnant. . Pt denies problems with prior pregnancy.  Pt denies vaginal bleeding. Pt reports LMP \"around first of last month.\" Pt reports associated N/V. Denies diarrhea, constipation, or problems urinating.  Pt is alert and oriented, speaking in full sentences, follows commands and responds appropriately to questions. NAD. Resp are even and unlabored.  Pt placed in lobby. Pt educated on triage process. Pt encouraged to alert staff for any changes.    "

## 2018-05-26 NOTE — ED NOTES
Pt and family educated on discharge instructions and follow up care. Pt verbalized understanding.

## 2018-06-11 ENCOUNTER — INITIAL PRENATAL (OUTPATIENT)
Dept: OBGYN | Facility: CLINIC | Age: 33
End: 2018-06-11

## 2018-06-11 VITALS
SYSTOLIC BLOOD PRESSURE: 108 MMHG | WEIGHT: 128 LBS | DIASTOLIC BLOOD PRESSURE: 68 MMHG | HEIGHT: 64 IN | BODY MASS INDEX: 21.85 KG/M2

## 2018-06-11 DIAGNOSIS — Z32.01 ENCOUNTER FOR PREGNANCY TEST, RESULT POSITIVE: ICD-10-CM

## 2018-06-11 DIAGNOSIS — N91.1 SECONDARY AMENORRHEA: ICD-10-CM

## 2018-06-11 PROBLEM — D72.829 LEUKOCYTOSIS: Status: RESOLVED | Noted: 2017-11-18 | Resolved: 2018-06-11

## 2018-06-11 PROBLEM — R74.01 TRANSAMINITIS: Status: RESOLVED | Noted: 2017-11-18 | Resolved: 2018-06-11

## 2018-06-11 LAB — IN CLINIC OB SCAN: NORMAL

## 2018-06-11 PROCEDURE — 76830 TRANSVAGINAL US NON-OB: CPT | Performed by: OBSTETRICS & GYNECOLOGY

## 2018-06-11 PROCEDURE — 99203 OFFICE O/P NEW LOW 30 MIN: CPT | Mod: 25 | Performed by: OBSTETRICS & GYNECOLOGY

## 2018-06-11 NOTE — PROGRESS NOTES
"Michelle Adame,  33 y.o.  female with Patient's last menstrual period was 2018. presents today with complaint of dysfunctional uterine bleeding.    Subjective : Patient presents to the office for absent menses.    Nausea/Vomiting: No:    Abdominal /pelvic cramping : Sometimes :  Vaginal bleeding:No      Pertinent positives documented in HPI and all other systems reviewed & are negative    OB History    Para Term  AB Living   2 1 1     1   SAB TAB Ectopic Molar Multiple Live Births             1      # Outcome Date GA Lbr Vincent/2nd Weight Sex Delivery Anes PTL Lv   2 Current            1 Term 09 40w0d  3.175 kg (7 lb) F Vag-Spont  N MARTHA          Past Gyn history:  Current Birth control:  none    History reviewed. No pertinent past medical history.  Past Surgical History:   Procedure Laterality Date   • CLEMENCIA BY LAPAROSCOPY  2017    Procedure: CLEMENCIA BY LAPAROSCOPY;  Surgeon: Abel Schmitt M.D.;  Location: SURGERY [facility];  Service: General     Meds: PNV daily    Allergies: Patient has no known allergies.    Physical Exam:    /68   Ht 1.626 m (5' 4\")   Wt 58.1 kg (128 lb)   LMP 2018   BMI 21.97 kg/m²   Gen: well-appearing, well-hydrated, well-nourished  HEENT: normal;   PERRLA, EOMI, sclera clear  Lungs: Clear to auscultation  Heart: RRR No M  Abd: abdomen is soft without significant tenderness, masses, organomegaly or guarding  Pelvic: deferred.  Ext: NT bilaterally, no cyanosis, clubbing or edema    No results found for this or any previous visit (from the past 336 hour(s)).    Transvaginal US performed and per my read:    Indication: pos preg test, amenorrhea    Findings: obrien intrauterine pregnancy @ 8w2d by CRL.   Positive gestational sac.  Positive yolk sac.   Positive fetal cardiac activity @ 173 BPM.   Right ovary with 1.5cm simple cyst/corpus luteum. Left Ovary normal. Cervical length normal.   No free fluid in the " cul-de-sac.    Impression: viable IUP @ 8w2d. EDC by US of 19    Assessment:  33 y.o.   Amenorrhea secondary  Pregnancy exam/test positive  IUP at 8w2d today by u/s    Plan:  4 weeks for new OB appt  Pap smear done early  per patient - signed records release today to obtain pap smear results from [facility], if unable to get pap will need pap at new OB appt  Normal pregnancy symptoms discussed  SAB/labor precautions educated  Order prenatal labs and any additional imaging needed at new OB visit  Drink at least 2 liters of water daily  Exercise 30 minutes daily  Call MD peralta/ questions or concerns

## 2018-06-11 NOTE — PROGRESS NOTES
today. Er visit on 5/25/18  Unknown LMP  On PNV  Phone # [phone]  Pharmacy verified with patient  c/o nausea. Denies vaginal bleeding or pain

## 2018-07-03 ENCOUNTER — APPOINTMENT (OUTPATIENT)
Dept: RADIOLOGY | Facility: MEDICAL CENTER | Age: 33
End: 2018-07-03
Attending: EMERGENCY MEDICINE

## 2018-07-03 ENCOUNTER — HOSPITAL ENCOUNTER (EMERGENCY)
Facility: MEDICAL CENTER | Age: 33
End: 2018-07-03
Attending: EMERGENCY MEDICINE

## 2018-07-03 VITALS
OXYGEN SATURATION: 93 % | WEIGHT: 131.17 LBS | BODY MASS INDEX: 22.39 KG/M2 | HEIGHT: 64 IN | HEART RATE: 74 BPM | DIASTOLIC BLOOD PRESSURE: 75 MMHG | RESPIRATION RATE: 18 BRPM | TEMPERATURE: 97.9 F | SYSTOLIC BLOOD PRESSURE: 98 MMHG

## 2018-07-03 DIAGNOSIS — O46.90 VAGINAL BLEEDING IN PREGNANCY: ICD-10-CM

## 2018-07-03 DIAGNOSIS — O26.892 ABDOMINAL PAIN DURING PREGNANCY IN SECOND TRIMESTER: ICD-10-CM

## 2018-07-03 DIAGNOSIS — R10.9 ABDOMINAL PAIN DURING PREGNANCY IN SECOND TRIMESTER: ICD-10-CM

## 2018-07-03 DIAGNOSIS — O20.0 ABORTION, THREATENED: ICD-10-CM

## 2018-07-03 LAB
ALBUMIN SERPL BCP-MCNC: 4 G/DL (ref 3.2–4.9)
ALBUMIN/GLOB SERPL: 1.2 G/DL
ALP SERPL-CCNC: 47 U/L (ref 30–99)
ALT SERPL-CCNC: 14 U/L (ref 2–50)
ANION GAP SERPL CALC-SCNC: 11 MMOL/L (ref 0–11.9)
APPEARANCE UR: CLEAR
AST SERPL-CCNC: 13 U/L (ref 12–45)
BASOPHILS # BLD AUTO: 0.5 % (ref 0–1.8)
BASOPHILS # BLD: 0.04 K/UL (ref 0–0.12)
BILIRUB SERPL-MCNC: 0.3 MG/DL (ref 0.1–1.5)
BILIRUB UR QL STRIP.AUTO: NEGATIVE
BUN SERPL-MCNC: 5 MG/DL (ref 8–22)
CALCIUM SERPL-MCNC: 9.6 MG/DL (ref 8.5–10.5)
CHLORIDE SERPL-SCNC: 104 MMOL/L (ref 96–112)
CO2 SERPL-SCNC: 21 MMOL/L (ref 20–33)
COLOR UR: YELLOW
CREAT SERPL-MCNC: 0.57 MG/DL (ref 0.5–1.4)
EOSINOPHIL # BLD AUTO: 0.06 K/UL (ref 0–0.51)
EOSINOPHIL NFR BLD: 0.7 % (ref 0–6.9)
ERYTHROCYTE [DISTWIDTH] IN BLOOD BY AUTOMATED COUNT: 44.4 FL (ref 35.9–50)
GLOBULIN SER CALC-MCNC: 3.3 G/DL (ref 1.9–3.5)
GLUCOSE SERPL-MCNC: 107 MG/DL (ref 65–99)
GLUCOSE UR STRIP.AUTO-MCNC: NEGATIVE MG/DL
HCT VFR BLD AUTO: 40.4 % (ref 37–47)
HGB BLD-MCNC: 13.5 G/DL (ref 12–16)
IMM GRANULOCYTES # BLD AUTO: 0.04 K/UL (ref 0–0.11)
IMM GRANULOCYTES NFR BLD AUTO: 0.5 % (ref 0–0.9)
KETONES UR STRIP.AUTO-MCNC: NEGATIVE MG/DL
LEUKOCYTE ESTERASE UR QL STRIP.AUTO: NEGATIVE
LYMPHOCYTES # BLD AUTO: 1.65 K/UL (ref 1–4.8)
LYMPHOCYTES NFR BLD: 18.6 % (ref 22–41)
MCH RBC QN AUTO: 31.1 PG (ref 27–33)
MCHC RBC AUTO-ENTMCNC: 33.4 G/DL (ref 33.6–35)
MCV RBC AUTO: 93.1 FL (ref 81.4–97.8)
MICRO URNS: NORMAL
MONOCYTES # BLD AUTO: 0.84 K/UL (ref 0–0.85)
MONOCYTES NFR BLD AUTO: 9.5 % (ref 0–13.4)
NEUTROPHILS # BLD AUTO: 6.25 K/UL (ref 2–7.15)
NEUTROPHILS NFR BLD: 70.2 % (ref 44–72)
NITRITE UR QL STRIP.AUTO: NEGATIVE
NRBC # BLD AUTO: 0 K/UL
NRBC BLD-RTO: 0 /100 WBC
NUMBER OF RH DOSES IND 8505RD: NORMAL
PH UR STRIP.AUTO: 8 [PH]
PLATELET # BLD AUTO: 301 K/UL (ref 164–446)
PMV BLD AUTO: 9.6 FL (ref 9–12.9)
POTASSIUM SERPL-SCNC: 3.6 MMOL/L (ref 3.6–5.5)
PROT SERPL-MCNC: 7.3 G/DL (ref 6–8.2)
PROT UR QL STRIP: NEGATIVE MG/DL
RBC # BLD AUTO: 4.34 M/UL (ref 4.2–5.4)
RBC UR QL AUTO: NEGATIVE
RH BLD: NORMAL
SODIUM SERPL-SCNC: 136 MMOL/L (ref 135–145)
SP GR UR STRIP.AUTO: 1.01
UROBILINOGEN UR STRIP.AUTO-MCNC: 0.2 MG/DL
WBC # BLD AUTO: 8.9 K/UL (ref 4.8–10.8)

## 2018-07-03 PROCEDURE — 94760 N-INVAS EAR/PLS OXIMETRY 1: CPT

## 2018-07-03 PROCEDURE — 85025 COMPLETE CBC W/AUTO DIFF WBC: CPT

## 2018-07-03 PROCEDURE — 86901 BLOOD TYPING SEROLOGIC RH(D): CPT

## 2018-07-03 PROCEDURE — 81003 URINALYSIS AUTO W/O SCOPE: CPT

## 2018-07-03 PROCEDURE — 80053 COMPREHEN METABOLIC PANEL: CPT

## 2018-07-03 PROCEDURE — 76817 TRANSVAGINAL US OBSTETRIC: CPT

## 2018-07-03 PROCEDURE — 99284 EMERGENCY DEPT VISIT MOD MDM: CPT

## 2018-07-03 NOTE — DISCHARGE INSTRUCTIONS
Follow-up with the pregnancy center this week for reevaluation and continued prenatal care.    Continue prenatal vitamins daily.  Encourage oral fluid hydration.  Limited activity, strict pelvic rest, no intravaginal objects/intercourse until seen for reevaluation.    Return to the emergency department for persistent worsening abdominal pain/cramping, vaginal bleeding (more than 1 pad per hour for 4 hours), fever, syncope or other new concerns.    Dolor abdominal megan el embarazo  (Abdominal Pain During Pregnancy)  El dolor de vientre (abdominal) es habitual megan el embarazo. Generalmente no se trata de un problema grave. Otras veces puede ser un signo de que algo no jacqui bhumi. Siempre comuníquese con menon médico si tiene dolor abdominal.  CUIDADOS EN EL HOGAR  Controle el dolor para daniel si hay cambios. Las indicaciones que siguen pueden ayudarla a sentirse mejor:  · Notenga sexo (relaciones sexuales) ni se coloque nada dentro de la vagina hasta que se sienta mejor.  · Diego reposo hasta que el dolor se calme.  · Si siente ganas de vomitar (náuseas ) junaid líquidos sabrina. No consuma alimentos sólidos hasta que se sienta mejor.  · Sólo tome los medicamentos que le haya indicado menon médico.  · Cumpla con las visitas al médico según las indicaciones.  SOLICITE AYUDA DE INMEDIATO SI:  · Tiene un sangrado, pierde líquido o elimina trozos de tejido por la vagina.  · Siente más dolor o cólicos.  · Comienza a vomitar.  · Siente dolor al orinar u observa ede en la orina.  · Tiene fiebre.  · No siente que el bebé se mueva mucho.  · Se siente muy débil o hazel que va a desmayarse.  · Tiene dificultad para respirar con o sin dolor en el vientre.  · Siente un dolor de douglas muy intenso y dolor en el vientre.  · Observa que sale un líquido por la vagina y tiene dolor abdominal.  · La materia fecal es líquida (diarrea).  · El dolor en el viente no desaparece, o empeora, luego de hacer reposo.  ASEGÚRESE DE QUE:  · Comprende  estas instrucciones.  · Controlará menon afección.  · Recibirá ayuda de inmediato si no mejora o si empeora.  Esta información no tiene lesli fin reemplazar el consejo del médico. Asegúrese de hacerle al médico cualquier pregunta que tenga.  Document Released: 08/29/2012 Document Revised: 04/10/2017 Document Reviewed: 07/17/2014  Elsevier Interactive Patient Education © 2017 [employer]    Hemorragia vaginal megan el embarazo (beau trimestre)  (Vaginal Bleeding During Pregnancy, Second Trimester)  Megan el embarazo, es común tener alfred pequeña hemorragia vaginal (manchas). A veces, la hemorragia es normal y no representa un problema, javon en algunas ocasiones es un síntoma de algo grave. Asegúrese de decirle a menon médico de inmediato si tiene algún tipo de hemorragia vaginal.  CUIDADOS EN EL HOGAR  · Controle menon afección para daniel si hay cambios.  · Siga las indicaciones de menon médico con respecto al kaleb de actividad que puede tener.  · Si debe hacer reposo en cama:  ¨ Es posible que deba quedarse en cama y levantarse únicamente para ir al baño.  ¨ Quizás le permitan hacer algunas actividades.  ¨ Si es necesario, planifique que alguien la ayude.  · Escriba:  ¨ La cantidad de toallas higiénicas que usa cada día.  ¨ La frecuencia con la que se cambia las toallas higiénicas.  ¨ Indique que tan empapados (saturados) están.  · No use tampones.  · No se blayne duchas vaginales.  · No tenga relaciones sexuales ni orgasmos hasta que el médico la autorice.  · Si elimina tejido por la vagina, guárdelo para mostrárselo al médico.  · [place] los medicamentos solamente lesli se lo haya indicado el médico.  · No tome aspirina, ya que puede causar hemorragias.  · No blayne ejercicios, no levante objetos pesados ni blayne ninguna actividad que exija mucha energía y esfuerzo, sherita que menon médico la autorice.  · Concurra a todas las visitas de control lesli se lo haya indicado el médico.  SOLICITE AYUDA SI:  · Tiene alfred hemorragia  vaginal.  · Tiene cólicos.  · Tiene janis de parto.  · Tiene fiebre que no desaparece después de carmel medicamentos.  SOLICITE AYUDA DE INMEDIATO SI:  · Siente cólicos muy intensos en la espalda o en el vientre (abdomen).  · Siente contracciones.  · Tiene escalofríos.  · Elimina coágulos grandes o tejido por la vagina.  · Tiene más hemorragia.  · Se siente débil o que va a desvanecerse.  · Pierde el conocimiento (se desmaya).  · Tiene alfred pérdida importante o sale líquido a borbotones por la vagina.  ASEGÚRESE DE QUE:  · Comprende estas instrucciones.  · Controlará meonn afección.  · Recibirá ayuda de inmediato si no mejora o si empeora.  Esta información no tiene lesli fin reemplazar el consejo del médico. Asegúrese de hacerle al médico cualquier pregunta que tenga.  Document Released: 05/04/2015 Document Revised: 05/04/2015 Document Reviewed: 08/25/2014  [employer] Interactive Patient Education © 2017 [employer]    Amenaza de aborto  (Threatened Miscarriage)  La amenaza de aborto se produce cuando hay hemorragia vaginal megan las primeras 20 semanas de embarazo, javon el embarazo no se interrumpe. El médico le hará pruebas para asegurarse de que el embarazo continúe. La causa de la hemorragia puede ser desconocida. Andreea trastorno no significa que el embarazo terminará. Sin embargo, aumenta el riesgo de que el embarazo se interrumpa (aborto completo).  CUIDADOS EN EL HOGAR  · Asegúrese de asistir a todas las citas de cuidados prenatales con el médico.  · Descanse lo suficiente.  · No tenga relaciones sexuales ni use tampones si tiene hemorragia vaginal.  · No se blayne duchas vaginales.  · No fume ni consuma drogas.  · No junaid alcohol.  · Evite la cafeína.  SOLICITE AYUDA SI:  · Tiene alfred hemorragia leve de la vagina.  · Tiene dolor o cólicos abdominales.  · Tiene fiebre.  SOLICITE AYUDA DE INMEDIATO SI:  · Tiene hemorragia abundante de la vagina.  · Elimina coágulos de ede por la vagina.  · Tiene mucho dolor en  el abdomen o la parte baja de la espalda, cólicos abdominales o calambres en la parte baja de la espalda.  · Tiene fiebre, escalofríos y mucho dolor abdominal.  ASEGÚRESE DE QUE:  · Comprende estas instrucciones.  · Controlará menon afección.  · Recibirá ayuda de inmediato si no mejora o si empeora.  Esta información no tiene lesli fin reemplazar el consejo del médico. Asegúrese de hacerle al médico cualquier pregunta que tenga.  Document Released: 01/20/2012 Document Revised: 12/23/2014 Document Reviewed: 10/14/2014  Elsevier Interactive Patient Education © 2017 Elsevier Inc.

## 2018-07-03 NOTE — ED PROVIDER NOTES
"ED Provider Note    CHIEF COMPLAINT  Chief Complaint   Patient presents with   • Vaginal Bleeding   • Pregnancy       HPI  Michelle Adame is a 33 y.o. female who ambulates to the emergency department with a family member for spotting.  Patient reportedly 12 weeks pregnant, LMP  with the pregnancy center, previous ultrasound confirming intrauterine pregnancy, now with one episode of spotting overnight.  Patient describes a light red blood on toilet paper when wiping last night, 1 drop of blood in the toilet bowl.  Very mild, intermittent low abdominal discomfort light cramping.  None at this time.  No fever chills.  No vomiting.  No dysuria, hematuria, frequency.  , previous full-term spontaneous vaginal delivery.    REVIEW OF SYSTEMS  See HPI for further details. All other systems are negative.     PAST MEDICAL HISTORY   Denies    SOCIAL HISTORY  Social History     Social History Main Topics   • Smoking status: Never Smoker   • Smokeless tobacco: Never Used   • Alcohol use No   • Drug use: No   • Sexual activity: Not on file       SURGICAL HISTORY   has a past surgical history that includes amelie by laparoscopy (2017).    CURRENT MEDICATIONS  Home Medications    **Home medications have not yet been reviewed for this encounter**         ALLERGIES  No Known Allergies    PHYSICAL EXAM  VITAL SIGNS: /61   Pulse 73   Temp 36.7 °C (98 °F)   Resp 18   Ht 1.626 m (5' 4\")   Wt 59.5 kg (131 lb 2.8 oz)   LMP 2018 (Approximate)   SpO2 93%   BMI 22.52 kg/m²   Pulse ox interpretation: I interpret this pulse ox as normal.  Constitutional: Alert in no apparent distress.  HENT: Normocephalic, atraumatic. Bilateral external ears normal, Nose normal. Moist mucous membranes.    Eyes: Pupils are equal and reactive, Conjunctiva normal.   Neck: Normal range of motion, Supple  Cardiovascular: Normal peripheral perfusion.  Thorax & Lungs: Nonlabored respirations.  Abdomen: Soft, " non-distended, non-tender to palpation.  No palpable fundus.  No CVA tenderness to percussion.  Skin: Warm, Dry  Musculoskeletal: Good range of motion in all major joints.  Neurologic: Alert and oriented ×4.  Ambulates independently.  Psychiatric: Affect normal, Judgment normal, Mood normal.       DIAGNOSTIC STUDIES / PROCEDURES    LABS  Results for orders placed or performed during the hospital encounter of 07/03/18   CBC WITH DIFFERENTIAL   Result Value Ref Range    WBC 8.9 4.8 - 10.8 K/uL    RBC 4.34 4.20 - 5.40 M/uL    Hemoglobin 13.5 12.0 - 16.0 g/dL    Hematocrit 40.4 37.0 - 47.0 %    MCV 93.1 81.4 - 97.8 fL    MCH 31.1 27.0 - 33.0 pg    MCHC 33.4 (L) 33.6 - 35.0 g/dL    RDW 44.4 35.9 - 50.0 fL    Platelet Count 301 164 - 446 K/uL    MPV 9.6 9.0 - 12.9 fL    Neutrophils-Polys 70.20 44.00 - 72.00 %    Lymphocytes 18.60 (L) 22.00 - 41.00 %    Monocytes 9.50 0.00 - 13.40 %    Eosinophils 0.70 0.00 - 6.90 %    Basophils 0.50 0.00 - 1.80 %    Immature Granulocytes 0.50 0.00 - 0.90 %    Nucleated RBC 0.00 /100 WBC    Neutrophils (Absolute) 6.25 2.00 - 7.15 K/uL    Lymphs (Absolute) 1.65 1.00 - 4.80 K/uL    Monos (Absolute) 0.84 0.00 - 0.85 K/uL    Eos (Absolute) 0.06 0.00 - 0.51 K/uL    Baso (Absolute) 0.04 0.00 - 0.12 K/uL    Immature Granulocytes (abs) 0.04 0.00 - 0.11 K/uL    NRBC (Absolute) 0.00 K/uL   COMP METABOLIC PANEL   Result Value Ref Range    Sodium 136 135 - 145 mmol/L    Potassium 3.6 3.6 - 5.5 mmol/L    Chloride 104 96 - 112 mmol/L    Co2 21 20 - 33 mmol/L    Anion Gap 11.0 0.0 - 11.9    Glucose 107 (H) 65 - 99 mg/dL    Bun 5 (L) 8 - 22 mg/dL    Creatinine 0.57 0.50 - 1.40 mg/dL    Calcium 9.6 8.5 - 10.5 mg/dL    AST(SGOT) 13 12 - 45 U/L    ALT(SGPT) 14 2 - 50 U/L    Alkaline Phosphatase 47 30 - 99 U/L    Total Bilirubin 0.3 0.1 - 1.5 mg/dL    Albumin 4.0 3.2 - 4.9 g/dL    Total Protein 7.3 6.0 - 8.2 g/dL    Globulin 3.3 1.9 - 3.5 g/dL    A-G Ratio 1.2 g/dL   RH TYPE FOR RHOGAM FROM E.D.   Result  Value Ref Range    Emergency Department Rh Typing POS     Number Of Rh Doses Indicated ZERO    URINALYSIS CULTURE, IF INDICATED   Result Value Ref Range    Color Yellow     Character Clear     Specific Gravity 1.013 <1.035    Ph 8.0 5.0 - 8.0    Glucose Negative Negative mg/dL    Ketones Negative Negative mg/dL    Protein Negative Negative mg/dL    Bilirubin Negative Negative    Urobilinogen, Urine 0.2 Negative    Nitrite Negative Negative    Leukocyte Esterase Negative Negative    Occult Blood Negative Negative    Micro Urine Req see below    ESTIMATED GFR   Result Value Ref Range    GFR If African American >60 >60 mL/min/1.73 m 2    GFR If Non African American >60 >60 mL/min/1.73 m 2       RADIOLOGY  US-OB PELVIS TRANSVAGINAL   Final Result      Viable single intrauterine gestation measures 12 weeks 0 days with appropriate growth seen from May comparison      No perigestational hemorrhage is detected          COURSE & MEDICAL DECISION MAKING  Nursing notes and vital signs were reviewed. (See chart for details)  The patients records were reviewed, history was obtained from the patient;     Medical record review: ED evaluation 5/25 for pelvic pain, confirms intrauterine pregnancy 6 weeks 1 day with yolk sac but no embryo, possible early intrauterine pregnancy.  Rh+.  Beta hCG 22,000.  Discharged with threatened miscarriage precautions and pregnancy center follow-up.  Seen at the pregnancy center 6/11 bedside ultrasound was single intrauterine pregnancy 8 weeks 2 days at 1 73 bpm.    ED evaluation for abdominal pain and vaginal bleeding and pregnancy although concerning for threatened or early miscarriage demonstrates a viable intrauterine pregnancy at age appropriate dates at this time.  No maikel-gestational hemorrhage detected.  Patient is asymptomatic in the emergency department.  One episode of vaginal bleeding prior to arrival, no persistent bleeding.  Hemodynamically stable, no fever, tachycardia.  Urinalysis  within normal limits.  Labs within normal limits, no anemia.  Rh+.  No indication for RhoGam.  Patient is aware the findings, cautioned for miscarriage as well.  Will follow up with gynecology this week.  Strict pelvic rest until then.    Patient is stable for discharge at this time, anticipatory guidance provided, continue prenatal vitamins, close follow-up is encouraged, and strict ED return instructions have been detailed. Patient and her friend are agreeable to the disposition and plan.    FINAL IMPRESSION  (O46.90) Vaginal bleeding in pregnancy  (O26.892,  R10.9) Abdominal pain during pregnancy in second trimester  (O20.0) , threatened      Electronically signed by: Mari Greco, 7/3/2018 7:35 AM      This dictation was created using voice recognition software. The accuracy of the dictation is limited to the abilities of the software. I expect there may be some errors of grammar and possibly content. The nursing notes were reviewed and certain aspects of this information were incorporated into this note.

## 2018-07-03 NOTE — ED TRIAGE NOTES
"Chief Complaint   Patient presents with   • Vaginal Bleeding   • Pregnancy     Patient ambulatory to triage. States that she is 12 weeks pregnant. Patient noticed blood this evening after wiping. Patient states that there was one drop of blood in the toilet. She also states that the only discomfort that she is experiencing is the feeling of \"almost having cramping\", but then it goes away. Interpretor services used for this encounter. /61   Pulse 75   Temp 36.7 °C (98 °F)   Resp 18   Ht 1.626 m (5' 4\")   Wt 59.5 kg (131 lb 2.8 oz)   LMP 04/12/2018 (Approximate)   SpO2 98%   BMI 22.52 kg/m²     "

## 2018-07-06 ENCOUNTER — HOSPITAL ENCOUNTER (OUTPATIENT)
Facility: MEDICAL CENTER | Age: 33
End: 2018-07-06
Attending: NURSE PRACTITIONER
Payer: COMMERCIAL

## 2018-07-06 ENCOUNTER — INITIAL PRENATAL (OUTPATIENT)
Dept: OBGYN | Facility: CLINIC | Age: 33
End: 2018-07-06

## 2018-07-06 ENCOUNTER — HOSPITAL ENCOUNTER (OUTPATIENT)
Dept: LAB | Facility: MEDICAL CENTER | Age: 33
End: 2018-07-06
Attending: NURSE PRACTITIONER
Payer: COMMERCIAL

## 2018-07-06 VITALS — WEIGHT: 127 LBS | SYSTOLIC BLOOD PRESSURE: 108 MMHG | DIASTOLIC BLOOD PRESSURE: 60 MMHG | BODY MASS INDEX: 21.8 KG/M2

## 2018-07-06 DIAGNOSIS — Z34.90 ENCOUNTER FOR SUPERVISION OF NORMAL PREGNANCY, ANTEPARTUM, UNSPECIFIED GRAVIDITY: ICD-10-CM

## 2018-07-06 DIAGNOSIS — Z34.80 SUPERVISION OF OTHER NORMAL PREGNANCY, ANTEPARTUM: ICD-10-CM

## 2018-07-06 LAB
ABO GROUP BLD: NORMAL
APPEARANCE UR: ABNORMAL
APPEARANCE UR: CLEAR
BACTERIA #/AREA URNS HPF: ABNORMAL /HPF
BASOPHILS # BLD AUTO: 0.5 % (ref 0–1.8)
BASOPHILS # BLD: 0.05 K/UL (ref 0–0.12)
BILIRUB UR QL STRIP.AUTO: NEGATIVE
BILIRUB UR STRIP-MCNC: NORMAL MG/DL
BLD GP AB SCN SERPL QL: NORMAL
COLOR UR AUTO: YELLOW
COLOR UR: ABNORMAL
EOSINOPHIL # BLD AUTO: 0.03 K/UL (ref 0–0.51)
EOSINOPHIL NFR BLD: 0.3 % (ref 0–6.9)
EPI CELLS #/AREA URNS HPF: ABNORMAL /HPF
ERYTHROCYTE [DISTWIDTH] IN BLOOD BY AUTOMATED COUNT: 44.9 FL (ref 35.9–50)
GLUCOSE UR STRIP.AUTO-MCNC: NEGATIVE MG/DL
GLUCOSE UR STRIP.AUTO-MCNC: NEGATIVE MG/DL
HBV SURFACE AG SER QL: NEGATIVE
HCT VFR BLD AUTO: 41.2 % (ref 37–47)
HGB BLD-MCNC: 13.9 G/DL (ref 12–16)
HIV 1+2 AB+HIV1 P24 AG SERPL QL IA: NON REACTIVE
HYALINE CASTS #/AREA URNS LPF: ABNORMAL /LPF
IMM GRANULOCYTES # BLD AUTO: 0.05 K/UL (ref 0–0.11)
IMM GRANULOCYTES NFR BLD AUTO: 0.5 % (ref 0–0.9)
KETONES UR STRIP.AUTO-MCNC: ABNORMAL MG/DL
KETONES UR STRIP.AUTO-MCNC: NEGATIVE MG/DL
LEUKOCYTE ESTERASE UR QL STRIP.AUTO: NEGATIVE
LEUKOCYTE ESTERASE UR QL STRIP.AUTO: NORMAL
LYMPHOCYTES # BLD AUTO: 1.63 K/UL (ref 1–4.8)
LYMPHOCYTES NFR BLD: 15.9 % (ref 22–41)
MCH RBC QN AUTO: 31.4 PG (ref 27–33)
MCHC RBC AUTO-ENTMCNC: 33.7 G/DL (ref 33.6–35)
MCV RBC AUTO: 93.2 FL (ref 81.4–97.8)
MICRO URNS: ABNORMAL
MONOCYTES # BLD AUTO: 0.78 K/UL (ref 0–0.85)
MONOCYTES NFR BLD AUTO: 7.6 % (ref 0–13.4)
MUCOUS THREADS #/AREA URNS HPF: ABNORMAL /HPF
NEUTROPHILS # BLD AUTO: 7.71 K/UL (ref 2–7.15)
NEUTROPHILS NFR BLD: 75.2 % (ref 44–72)
NITRITE UR QL STRIP.AUTO: NEGATIVE
NITRITE UR QL STRIP.AUTO: NEGATIVE
NRBC # BLD AUTO: 0 K/UL
NRBC BLD-RTO: 0 /100 WBC
PH UR STRIP.AUTO: 5 [PH]
PH UR STRIP.AUTO: 5.5 [PH] (ref 5–8)
PLATELET # BLD AUTO: 303 K/UL (ref 164–446)
PMV BLD AUTO: 10 FL (ref 9–12.9)
PROT UR QL STRIP: NEGATIVE MG/DL
PROT UR QL STRIP: NEGATIVE MG/DL
RBC # BLD AUTO: 4.42 M/UL (ref 4.2–5.4)
RBC # URNS HPF: ABNORMAL /HPF
RBC UR QL AUTO: NEGATIVE
RBC UR QL AUTO: NORMAL
RH BLD: NORMAL
RUBV AB SER QL: 70.9 IU/ML
SP GR UR STRIP.AUTO: 1.02
SP GR UR STRIP.AUTO: 1.03
TREPONEMA PALLIDUM IGG+IGM AB [PRESENCE] IN SERUM OR PLASMA BY IMMUNOASSAY: NON REACTIVE
UROBILINOGEN UR STRIP-MCNC: NORMAL MG/DL
UROBILINOGEN UR STRIP.AUTO-MCNC: 0.2 MG/DL
WBC # BLD AUTO: 10.3 K/UL (ref 4.8–10.8)
WBC #/AREA URNS HPF: ABNORMAL /HPF

## 2018-07-06 PROCEDURE — 81002 URINALYSIS NONAUTO W/O SCOPE: CPT | Performed by: NURSE PRACTITIONER

## 2018-07-06 PROCEDURE — 8198 PREG CTR PKG RATE (SYSTEM): Performed by: NURSE PRACTITIONER

## 2018-07-06 PROCEDURE — 59401 PR NEW OB VISIT: CPT | Performed by: NURSE PRACTITIONER

## 2018-07-06 NOTE — PROGRESS NOTES
Pt here toady for NOB visit  LMP: Unknown  Pt had  on 06/11/2018  Pt states she was seen at [facility] ER on 07/03 due to vaginal spotting. States no spotting since.   Pt states no complaints today  WT: 127 lb  BP: 108/60  Desires AFP  Declines BTL  Good # [phone]

## 2018-07-06 NOTE — PROGRESS NOTES
Subjective:      Michelle Adame is a 33 y.o. female who presents with No chief complaint on file.            HPI    ROS       Objective:     /60   Wt 57.6 kg (127 lb)   BMI 21.80 kg/m²      Physical Exam   Constitutional: She is oriented to person, place, and time. She appears well-developed and well-nourished.   HENT:   Head: Normocephalic.   Eyes: Pupils are equal, round, and reactive to light.   Neck: Normal range of motion. Neck supple. No thyromegaly present.   Cardiovascular: Normal rate and regular rhythm.    Pulmonary/Chest: Effort normal and breath sounds normal.   Breast exam WNL   Abdominal: Soft. She exhibits no distension and no mass. There is no tenderness. There is no rebound and no guarding. No hernia.   Genitourinary: Vagina normal and uterus normal. No labial fusion. There is no rash, tenderness, lesion or injury on the right labia. There is no rash, tenderness, lesion or injury on the left labia.   Musculoskeletal: Normal range of motion.   Neurological: She is alert and oriented to person, place, and time.   Skin: Skin is warm and dry.   Psychiatric: She has a normal mood and affect. Her behavior is normal. Judgment and thought content normal.               Assessment/Plan:     1. Encounter for supervision of normal pregnancy, antepartum, unspecified     - Prenatal MV-Min-Fe Fum-FA-DHA (PRENATAL 1 PO); Take  by mouth.  - POCT Urinalysis  - US-OB 2ND 3RD TRI COMPLETE; Future  - PREG CNTR PRENATAL PN; Future  - THINPREP RFLX HPV ASCUS W/CTNG; Future    2. Supervision of other normal pregnancy    - Prenatal MV-Min-Fe Fum-FA-DHA (PRENATAL 1 PO); Take  by mouth.  - POCT Urinalysis  - US-OB 2ND 3RD TRI COMPLETE; Future  - PREG CNTR PRENATAL PN; Future  - THINPREP RFLX HPV ASCUS W/CTNG; Future

## 2018-07-06 NOTE — PROGRESS NOTES
Subjective:   Michelle Adame is a 33 y.o.  who presents for her new OB exam.  She is 11w6d with an RYAN of Estimated Date of Delivery: 19 by  at 8 weeks and US at in ED this week. She is feeling well and has no concerns at this time. Denies VB, LOF, contractions or pain. No ER visits or previous care in this pregnancy. Denies dysuria, vaginal DC, fever. Reports no fetal movement. Desires AFP.  Declines CF.      History reviewed. No pertinent past medical history.    Psych Hx: Patient denies any history of depression, anxiety, PTSD, bipolar or any other psychological issues.     Past Surgical History:   Procedure Laterality Date   • CLEMENCIA BY LAPAROSCOPY  2017    Procedure: CLEMENCIA BY LAPAROSCOPY;  Surgeon: Abel Schmitt M.D.;  Location: SURGERY [facility];  Service: General        OB History    Para Term  AB Living   2 1 1     1   SAB TAB Ectopic Molar Multiple Live Births             1      # Outcome Date GA Lbr Vincent/2nd Weight Sex Delivery Anes PTL Lv   2 Current            1 Term 09 40w0d  3.175 kg (7 lb) F Vag-Spont None N MARTHA      Birth Comments: Pt states no complications           Gynecological Hx: Denies any hx of STIs, including HSV. Denies any vulvovaginal disorders and no hx of abnormal cervical cytology. Last pap [facility] 2018; records requested but none received.     Sexual Hx: One current male partner, who is FOB     Contraceptive Hx: Has not used in the past and has since discontinued use.     History reviewed. No pertinent family history.  Denies any genetic disorders in family history.     Social History     Social History   • Marital status: Single     Spouse name: N/A   • Number of children: N/A   • Years of education: N/A     Occupational History   • Not on file.     Social History Main Topics   • Smoking status: Never Smoker   • Smokeless tobacco: Never Used   • Alcohol use No   • Drug use: No   • Sexual activity: Not Currently      Partners: Male      Comment: Planned pregnancy     Other Topics Concern   • Not on file     Social History Narrative   • No narrative on file       FOB is semi-involved and does not live with Michelle Adame. She lives with her sister. Pregnancy is planned.    She is currently working as , denies any heavy lifting or exposure to potential teratogens like environmental or occupational toxins.   Denies alcohol use, drug use, or tobacco use in pregnancy.   Denies any current or hx of sexual, emotional or physical abuse or trauma.     Current Medications: PNV   Allergies: Denies allergies to medications, food, or environmental allergies    Objective:      Vitals:    07/06/18 0756   BP: 108/60   Weight: 57.6 kg (127 lb)        See Prenatal Physical and Prenatal Vitals  UA WNL today      Assessment:      1.  IUP @ 11w6d per  at 8 weeks      2.  S=D      3.  See problem list as follows       Patient Active Problem List    Diagnosis Date Noted   • Supervision of other normal pregnancy 07/06/2018         Plan:   -  GC/CT & pap done today   - Prenatal labs ordered - lab slip given  - Discussed PNV, nutrition, adequate water intake, and exercise/weight gain in pregnancy  - NOB informational packet with anticipatory guidance given  - Information on Centering Pregnancy given, pt Mongolian only  - S/sx of pregnancy warning signs and PTL precautions given  - Complete OB US in 8 wks  - Return to [facility] in 4 wks

## 2018-07-06 NOTE — LETTER
Cystic Fibrosis Carrier Testing  Michelle Adame    The following information is about a blood test that can be done to determine if you and/or your partner carry the gene for cystic fibrosis.    WHAT IS CYSTIC FIBROSIS?  · Cystic fibrosis (CF) is an inherited disease that affects more than 25,000 American children and young adults.  · Symptoms of CF vary but include lung congestion, pneumonia, diarrhea and poor growth.  Most people with CF have severe medical problems and some die at a young age.  Others have so few symptoms they are unaware they have CF.  · CF does not affect intelligence.  · Although there is no cure for CF at this time, scientists are making progress in improving treatment and in searching for a cure.  In the past many people with CF  at a very young age.  Today, many are living into their 20’s and 30’s.    IS THERE A CHANCE MY BABY COULD HAVE CYSTIC FIBROSIS?  · You can have a child with CF even if there is no history in your family (see chart below).  · CF testing can help determine if you are a carrier and at risk to have a child with CF.  Note: if both parents are carriers, there is a 1 in 4 (25%) chance with each pregnancy that they will have a child with CF.  · Carriers have one normal CF gene and one altered CF gene.  · People with CF have two altered CF genes.  · Most people have two normal copies of the CF gene.    Approximate risk that a couple with no family history of cystic fibrosis will have a child with cystic fibrosis:    Ethnic background / Risk     couple:  1 in 2,500   couple:  1 in 15,000            couple:  1 in 8,000     American couple:  1 in 32,000     WHAT TESTING IS AVAILABLE?  · There is a blood test that can be done to find out if you or your partner is a carrier.  · It is important to understand that CF carrier testing does not detect all CF carriers.  · If the test shows that you are both CF carriers, you unborn  baby can be tested to find out if the baby has CF.    HOW MUCH DOES IT COST TO HAVE CYSTIC FIBROSIS CARRIER TESTING?  · Cost and insurance coverage for CF carrier testing vary depending upon the laboratory used and your insurance policy.  · The average cost for CF carrier testing is $300 per person.  · Your genetic counselor can provide you with more information about cystic fibrosis carrier testing.    _____  Yes, I am interested in discussing carrier testing with a genetic counselor.    _____  No, I am not interested in CF carrier testing or in receiving more information about CF carrier testing.      Client signature: ________________________________________  7/6/2018

## 2018-07-10 LAB
C TRACH DNA GENITAL QL NAA+PROBE: NEGATIVE
CYTOLOGY REG CYTOL: NORMAL
N GONORRHOEA DNA GENITAL QL NAA+PROBE: NEGATIVE
SPECIMEN SOURCE: NORMAL

## 2018-08-03 ENCOUNTER — ROUTINE PRENATAL (OUTPATIENT)
Dept: OBGYN | Facility: CLINIC | Age: 33
End: 2018-08-03

## 2018-08-03 ENCOUNTER — HOSPITAL ENCOUNTER (OUTPATIENT)
Dept: LAB | Facility: MEDICAL CENTER | Age: 33
End: 2018-08-03
Attending: NURSE PRACTITIONER
Payer: COMMERCIAL

## 2018-08-03 VITALS — BODY MASS INDEX: 22.49 KG/M2 | SYSTOLIC BLOOD PRESSURE: 106 MMHG | DIASTOLIC BLOOD PRESSURE: 58 MMHG | WEIGHT: 131 LBS

## 2018-08-03 DIAGNOSIS — Z34.80 SUPERVISION OF OTHER NORMAL PREGNANCY, ANTEPARTUM: ICD-10-CM

## 2018-08-03 PROCEDURE — 90040 PR PRENATAL FOLLOW UP: CPT | Performed by: NURSE PRACTITIONER

## 2018-08-03 NOTE — PROGRESS NOTES
Pt here today for OB follow up  Denies FM  WT: 131 lb  BP: 106/58  US 09/21/18   Desires AFP  Pt states no complaints today  Good # [phone]

## 2018-08-03 NOTE — PROGRESS NOTES
SUBJECTIVE:  Pt is a 33 y.o.   at 15w6d  gestation. Presents today for follow-up prenatal care. Reports no issues at this time.  Reports no fetal movement. Denies cramping/contractions, bleeding or leaking of fluid. Denies dysuria, headaches, N/V, or other issues at this time. Generally feels well today. Tail bone soreness reported     OBJECTIVE:  - See prenatal vitals flow  -   Vitals:    18 1332   BP: 106/58   Weight: 59.4 kg (131 lb)                 ASSESSMENT:   - IUP at 15w6d    - S=D   -   Patient Active Problem List    Diagnosis Date Noted   • Supervision of other normal pregnancy 2018         PLAN:  - S/sx pregnancy and labor warning signs vs general discomforts discussed  - Fetal movements and kick counts reviewed   - Adequate hydration reinforced  - Nutrition/exercise/vitamin education; continued PNV  -Remedies for back pain   - AFP ordered  - US   - Anticipatory guidance given  - RTC in 4 weeks for follow-up prenatal care

## 2018-08-06 LAB
# FETUSES US: NORMAL
AFP MOM SERPL: 1.08
AFP SERPL-MCNC: 38 NG/ML
AGE - REPORTED: 31.7 YR
CURRENT SMOKER: NO
FAMILY MEMBER DISEASES HX: NO
GA METHOD: NORMAL
GA: NORMAL WK
HCG MOM SERPL: 1.21
HCG SERPL-ACNC: NORMAL IU/L
HX OF HEREDITARY DISORDERS: NO
IDDM PATIENT QL: NO
INHIBIN A MOM SERPL: 0.88
INHIBIN A SERPL-MCNC: 161 PG/ML
INTEGRATED SCN PATIENT-IMP: NORMAL
PATHOLOGY STUDY: NORMAL
SPECIMEN DRAWN SERPL: NORMAL
U ESTRIOL MOM SERPL: 0.96
U ESTRIOL SERPL-MCNC: 0.84 NG/ML

## 2018-09-04 ENCOUNTER — ROUTINE PRENATAL (OUTPATIENT)
Dept: OBGYN | Facility: CLINIC | Age: 33
End: 2018-09-04
Payer: MEDICAID

## 2018-09-04 VITALS — BODY MASS INDEX: 23.52 KG/M2 | WEIGHT: 137 LBS | SYSTOLIC BLOOD PRESSURE: 108 MMHG | DIASTOLIC BLOOD PRESSURE: 60 MMHG

## 2018-09-04 DIAGNOSIS — Z34.80 SUPERVISION OF OTHER NORMAL PREGNANCY, ANTEPARTUM: ICD-10-CM

## 2018-09-04 PROCEDURE — 90040 PR PRENATAL FOLLOW UP: CPT | Performed by: NURSE PRACTITIONER

## 2018-09-04 NOTE — PROGRESS NOTES
SUBJECTIVE:  Pt is a 33 y.o.   at 20w3d  gestation. Presents today for follow-up prenatal care. Reports no issues at this time.  Reports fetal movement. Denies cramping/contractions, bleeding or leaking of fluid. Denies dysuria, headaches, N/V, or other issues at this time. Generally feels well today.     OBJECTIVE:  - See prenatal vitals flow  -   Vitals:    18 1330   BP: 108/60   Weight: 62.1 kg (137 lb)                 ASSESSMENT:   - IUP at 20w3d    - S=D   -   Patient Active Problem List    Diagnosis Date Noted   • Supervision of other normal pregnancy 2018         PLAN:  - S/sx pregnancy and labor warning signs vs general discomforts discussed  - Fetal movements and kick counts reviewed   - Adequate hydration reinforced  - Nutrition/exercise/vitamin education; continued PNV  - Encouraged tour of LnD/childbirth education classes: contact info provided   - Advised to move up US as she is now 20 weeks and scheduled for   - Reviewed weight gain and exercise/nutrition   - Anticipatory guidance given  - RTC in 4 weeks for follow-up prenatal care

## 2018-09-04 NOTE — PROGRESS NOTES
Pt here today for OB follow up  Pt states no complaints   Reports +  Good # [phone]  Pharmacy Confirmed.  Pt has u/s 9/21

## 2018-09-10 ENCOUNTER — APPOINTMENT (OUTPATIENT)
Dept: RADIOLOGY | Facility: IMAGING CENTER | Age: 33
End: 2018-09-10
Attending: NURSE PRACTITIONER

## 2018-09-10 ENCOUNTER — DATING (OUTPATIENT)
Dept: OBGYN | Facility: CLINIC | Age: 33
End: 2018-09-10

## 2018-09-10 DIAGNOSIS — Z34.80 SUPERVISION OF OTHER NORMAL PREGNANCY, ANTEPARTUM: ICD-10-CM

## 2018-09-10 DIAGNOSIS — Z34.90 ENCOUNTER FOR SUPERVISION OF NORMAL PREGNANCY, ANTEPARTUM, UNSPECIFIED GRAVIDITY: ICD-10-CM

## 2018-09-10 PROCEDURE — 76805 OB US >/= 14 WKS SNGL FETUS: CPT | Performed by: OBSTETRICS & GYNECOLOGY

## 2018-10-04 ENCOUNTER — ROUTINE PRENATAL (OUTPATIENT)
Dept: OBGYN | Facility: CLINIC | Age: 33
End: 2018-10-04

## 2018-10-04 VITALS — SYSTOLIC BLOOD PRESSURE: 102 MMHG | WEIGHT: 140 LBS | BODY MASS INDEX: 24.03 KG/M2 | DIASTOLIC BLOOD PRESSURE: 58 MMHG

## 2018-10-04 DIAGNOSIS — Z34.80 SUPERVISION OF OTHER NORMAL PREGNANCY, ANTEPARTUM: ICD-10-CM

## 2018-10-04 PROCEDURE — 90471 IMMUNIZATION ADMIN: CPT | Performed by: NURSE PRACTITIONER

## 2018-10-04 PROCEDURE — 90040 PR PRENATAL FOLLOW UP: CPT | Performed by: NURSE PRACTITIONER

## 2018-10-04 PROCEDURE — 90686 IIV4 VACC NO PRSV 0.5 ML IM: CPT | Performed by: NURSE PRACTITIONER

## 2018-10-04 RX ORDER — OLOPATADINE HYDROCHLORIDE 1 MG/ML
SOLUTION/ DROPS OPHTHALMIC
Refills: 2 | COMMUNITY
Start: 2018-08-28

## 2018-10-04 NOTE — PROGRESS NOTES
OB f/u. + fetal movement.  No VB, LOF or UC's.  Wt:  140lb     BP: 102/58  Good phone # [phone]  Preferred pharmacy confirmed.  3rd trimester lab orders pended and instructions given to patient  Flu vaccine given today     “chaperone offered and provided”    “Chaperone offered, patient declined”

## 2018-10-04 NOTE — NON-PROVIDER
Flu vaccine given. R   Deltoid. Screening checklist reviewed with patient. VIS given. Verified by LB

## 2018-10-04 NOTE — PROGRESS NOTES
SUBJECTIVE:  Pt is a 33 y.o.   at 24w5d  gestation. Presents today for follow-up prenatal care. Reports no issues at this time.  Reports fetal movement. Denies cramping/contractions, bleeding or leaking of fluid. Denies dysuria, headaches, N/V, or other issues at this time. Generally feels well today.     OBJECTIVE:  - See prenatal vitals flow  -   Vitals:    10/04/18 0847   BP: 102/58   Weight: 63.5 kg (140 lb)                 ASSESSMENT:   - IUP at 24w5d    - S=D   -   Patient Active Problem List    Diagnosis Date Noted   • Supervision of other normal pregnancy 2018         PLAN:  - S/sx pregnancy and labor warning signs vs general discomforts discussed  - Fetal movements and kick counts reviewed   - Adequate hydration reinforced  - Nutrition/exercise/vitamin education; continued PNV  - S/p flu vacc today   - Anticipatory guidance given  - RTC in 4 weeks for follow-up prenatal care

## 2018-10-25 ENCOUNTER — HOSPITAL ENCOUNTER (OUTPATIENT)
Dept: LAB | Facility: MEDICAL CENTER | Age: 33
End: 2018-10-25
Attending: NURSE PRACTITIONER
Payer: COMMERCIAL

## 2018-10-25 DIAGNOSIS — Z34.80 SUPERVISION OF OTHER NORMAL PREGNANCY, ANTEPARTUM: ICD-10-CM

## 2018-10-25 LAB
GLUCOSE 1H P 50 G GLC PO SERPL-MCNC: 145 MG/DL (ref 70–139)
HCT VFR BLD AUTO: 38.3 % (ref 37–47)
HGB BLD-MCNC: 12.3 G/DL (ref 12–16)

## 2018-10-26 LAB — TREPONEMA PALLIDUM IGG+IGM AB [PRESENCE] IN SERUM OR PLASMA BY IMMUNOASSAY: NON REACTIVE

## 2018-10-29 DIAGNOSIS — R73.09 ELEVATED GLUCOSE: ICD-10-CM

## 2018-11-01 ENCOUNTER — ROUTINE PRENATAL (OUTPATIENT)
Dept: OBGYN | Facility: CLINIC | Age: 33
End: 2018-11-01

## 2018-11-01 VITALS — SYSTOLIC BLOOD PRESSURE: 118 MMHG | BODY MASS INDEX: 24.72 KG/M2 | WEIGHT: 144 LBS | DIASTOLIC BLOOD PRESSURE: 60 MMHG

## 2018-11-01 DIAGNOSIS — Z34.80 SUPERVISION OF OTHER NORMAL PREGNANCY, ANTEPARTUM: ICD-10-CM

## 2018-11-01 PROCEDURE — 90040 PR PRENATAL FOLLOW UP: CPT | Performed by: NURSE PRACTITIONER

## 2018-11-01 PROCEDURE — 90715 TDAP VACCINE 7 YRS/> IM: CPT | Performed by: NURSE PRACTITIONER

## 2018-11-01 PROCEDURE — 90471 IMMUNIZATION ADMIN: CPT | Performed by: NURSE PRACTITIONER

## 2018-11-01 NOTE — PROGRESS NOTES
Pt here today for OB follow up  Reports +FM  WT: 144 lb  BP: 118/60  Pt states no complaints today  ANDRES sheet given and explained today  BTL discussed today, pt declines BTL  Desires Tdap vaccine  Pt informed today that her1 hr gtt test are elevated and needs to do the 3 hr gtt, lab slip and instructions given to patient.   Good # [phone]    Tdap vaccine given. Right Deltoid. VIS given and screening check list reviewed with pt.  Tdap vaccine verified by Mame Benson D.N.P.

## 2018-11-01 NOTE — PROGRESS NOTES
SUBJECTIVE:  Pt is a 33 y.o.   at 28w5d  gestation. Presents today for follow-up prenatal care. Reports no issues at this time.  Reports good  fetal movement. Denies cramping/contractions, bleeding or leaking of fluid. Denies dysuria, headaches, N/V, or other issues at this time. Generally feels well today.     OBJECTIVE:  - See prenatal vitals flow  -   Vitals:    18 0959   BP: 118/60   Weight: 65.3 kg (144 lb)      Labs - normal prenatal labs, normal glucose  US - normal fetal survey            ASSESSMENT:   - IUP at 28w5d    - S=D   -   Patient Active Problem List    Diagnosis Date Noted   • Supervision of other normal pregnancy 2018         PLAN:  - S/sx pregnancy and labor warning signs vs general discomforts discussed  - Fetal movements and kick counts reviewed   - Adequate hydration reinforced  - Nutrition/exercise/vitamin education; continued PNV  - Encouraged tour of LnD/childbirth education classes: contact info provided   -tdap today.

## 2018-11-01 NOTE — LETTER
Cuente los Movimientos de menon Bebé  Otro paso importante para la mk de menon bebé    Michelle Edith Adame     [facility]            Dept: [phone]    ¿Cuántas semanas tiene de embarazo? 28w5d    Fecha cuando tiene que comenzar a contar el movimiento: Hoy 11/01/2018                  Ceasar debe usar gonzalo diagrama    Alfred manera en que menon doctor puede controlar a mk de menon bebé es sabiendo cuantas veces se mueve menon bebé en el útero, o por medio de las “pataditas”.  Usted podrá ayudarle a menon médico al usar cada día el siguiente diagrama.    Cada día, usted debe prestar atención a cuantas horas le lleva a menon bebé moverse 10 veces.  Comience a contar en la mañana, lo antes posible después de haberse levantado.    · Primeramente, escriba la hora en que se mueve menon bebé, hasta llegar a 10 veces.  · Colóquele un check o palomita a cada cuadrito cada vez que menon bebé se mueva hasta que complete 10 veces.  · Escriba la hora cuando termine de contar 10 veces en la última columna.  · Sume el total del tiempo que le llevó contar los 10 movimientos.  · Finalmente, complete el cuadrito de cuantas horas le llevó hacerlo.    Después de brielle contado los 10 movimientos, ya no tendrá que contar los demás movimientos por el dexter del día.  A la mañana siguiente, comience a contar de nuevo cuantas veces se mueve el bebé desde el momento en que se levante.    ¿Qué tendría que considerarse un “movimiento”?  Es difícil de decirlo porque es distinto de alfred madre a otra, y de un embarazo a otro.  Lo importante es que cuente el movimiento de la misma manera megan el transcurso de menon embarazo.  Si tiene preguntas adicionales, pregúntele a menon doctor.    ¡Cuente cuidadosamente cada día!     MUESTRA:  Semana 28    ¿Cuántas horas le ha llevado sentir 10 movimientos?        Hora de Inicio     1     2     3     4     5     6     7     8     9     10   Hora de Finlizar   Maris. 8:20 ·  ·  ·  ·  ·  ·  ·  ·  ·  ·  11:40   Mar.                Mié.               Jue.               Vie.               Sáb.               Dom.                 IMPORTANTE:  Usted debe contactar a menon doctor si le lleva más de 2 horas sentir 10 movimientos de menon bebé.    Cada mañana, escriba la hora de inicio y comience a contar los movimientos de menon bebé.  Hágalo colocándole un check o palomita a cada cuadrito cada vez que sienta un movimiento de menon bebé.  Cuando haya sentido 10 “pataditas”, escriba la hora en que terminó de contar en la última columna.  Luego, complete en la cajita (arriba de la aster de check o palomita) el número total de horas que le llevó hacerlo.  Asegúrese de leer completamente las instrucciones en la página anterior.

## 2018-11-06 ENCOUNTER — HOSPITAL ENCOUNTER (OUTPATIENT)
Dept: LAB | Facility: MEDICAL CENTER | Age: 33
End: 2018-11-06
Attending: NURSE PRACTITIONER
Payer: COMMERCIAL

## 2018-11-06 DIAGNOSIS — R73.09 ELEVATED GLUCOSE: ICD-10-CM

## 2018-11-06 LAB
GLUCOSE 1H P CHAL SERPL-MCNC: 130 MG/DL (ref 65–180)
GLUCOSE 2H P CHAL SERPL-MCNC: 125 MG/DL (ref 65–155)
GLUCOSE 3H P CHAL SERPL-MCNC: 106 MG/DL (ref 65–140)
GLUCOSE BS SERPL-MCNC: 82 MG/DL (ref 65–95)

## 2018-11-15 ENCOUNTER — ROUTINE PRENATAL (OUTPATIENT)
Dept: OBGYN | Facility: CLINIC | Age: 33
End: 2018-11-15

## 2018-11-15 VITALS — DIASTOLIC BLOOD PRESSURE: 60 MMHG | BODY MASS INDEX: 25.23 KG/M2 | WEIGHT: 147 LBS | SYSTOLIC BLOOD PRESSURE: 120 MMHG

## 2018-11-15 DIAGNOSIS — Z34.80 SUPERVISION OF OTHER NORMAL PREGNANCY, ANTEPARTUM: Primary | ICD-10-CM

## 2018-11-15 PROCEDURE — 90040 PR PRENATAL FOLLOW UP: CPT | Performed by: NURSE PRACTITIONER

## 2018-11-15 NOTE — PROGRESS NOTES
OB f/u. + fetal movement.  No VB, LOF or UC's.  Wt: 147lb      BP:120/60  Good phone # [phone]  Preferred pharmacy confirmed.  BTL declined

## 2018-11-15 NOTE — PROGRESS NOTES
S) Pt is a 33 y.o.   at 30w5d  gestation. Routine prenatal care today. No complaints today. Tdap and flu shots already done. BTL declined.  labor precautions discussed, all questions answered.   Fetal movement Normal  Cramping no  VB no  LOF no   Denies dysuria. Generally feels well today. Good self-care activities identified. Denies headaches, swelling, visual changes, or epigastric pain .     O) Blood pressure 120/60, weight 66.7 kg (147 lb).        Labs:       PNL: WNL       GCT: 145, 3 hour WNL        AFP: normal       GBS: N/A       Pertinent ultrasound -        9/10/18- Survey WNL, RAFAEL 12.87cm, c/w prev dating    A) IUP at 30w5d       S=D         Patient Active Problem List    Diagnosis Date Noted   • Supervision of other normal pregnancy 2018          SVE: deferred       Chaperone offered: n/a         TDAP: yes       FLU: yes        BTL: no       : n/a       C/S Consent: n/a       IOL or C/S scheduled: no       LAST PAP: 18- Negative         P) s/s ptl vs general discomforts. Fetal movements reviewed. General ed and anticipatory guidance. Nutrition/exercise/vitamin. Plans breast Plans pp contraception- unsure  Continue PNV.

## 2018-11-29 ENCOUNTER — ROUTINE PRENATAL (OUTPATIENT)
Dept: OBGYN | Facility: CLINIC | Age: 33
End: 2018-11-29

## 2018-11-29 VITALS — DIASTOLIC BLOOD PRESSURE: 60 MMHG | WEIGHT: 148 LBS | BODY MASS INDEX: 25.4 KG/M2 | SYSTOLIC BLOOD PRESSURE: 100 MMHG

## 2018-11-29 DIAGNOSIS — Z34.80 SUPERVISION OF OTHER NORMAL PREGNANCY, ANTEPARTUM: Primary | ICD-10-CM

## 2018-11-29 PROCEDURE — 90040 PR PRENATAL FOLLOW UP: CPT | Performed by: NURSE PRACTITIONER

## 2018-11-29 NOTE — PROGRESS NOTES
S) Pt is a 33 y.o.   at 32w5d  gestation. Routine prenatal care today. Pt having some irregular tobin casanova ctx, otherwise feeling well.    Fetal movement Normal  Cramping no  VB no  LOF no   Denies dysuria. Generally feels well today. Good self-care activities identified. Denies headaches, swelling, visual changes, or epigastric pain .     O) Blood pressure 100/60, weight 67.1 kg (148 lb).        Labs:       PNL: WNL       GCT: 145, 3 hr WNL       AFP: normal       GBS: N/A       Pertinent ultrasound - 9/10/18- Survey WNL, RAFAEL 12.87cm, c/w prev dating           A) IUP at 32w5d       S=D         Patient Active Problem List    Diagnosis Date Noted   • Supervision of other normal pregnancy 2018          SVE: deferred         TDAP: yes       FLU: yes        BTL: no       : no       C/S Consent: no       IOL or C/S scheduled: no       LAST PAP: 18 negative         P) s/s ptl vs general discomforts.   Fetal movements reviewed. General ed and anticipatory guidance. Nutrition/exercise/vitamin. Plans breast Plans pp contraception- unsure -information sheet given.  RTC 2 weeks or PRN

## 2018-11-29 NOTE — PROGRESS NOTES
Pt here today for OB follow up  Pt states no complaints  Reports +  Good # [phone]  Pharmacy Confirmed.  Chaperone offered and none needed.

## 2018-12-13 ENCOUNTER — ROUTINE PRENATAL (OUTPATIENT)
Dept: OBGYN | Facility: CLINIC | Age: 33
End: 2018-12-13

## 2018-12-13 VITALS — SYSTOLIC BLOOD PRESSURE: 108 MMHG | BODY MASS INDEX: 25.92 KG/M2 | DIASTOLIC BLOOD PRESSURE: 70 MMHG | WEIGHT: 151 LBS

## 2018-12-13 DIAGNOSIS — Z34.80 SUPERVISION OF OTHER NORMAL PREGNANCY, ANTEPARTUM: ICD-10-CM

## 2018-12-13 LAB
APPEARANCE UR: NORMAL
BILIRUB UR STRIP-MCNC: NORMAL MG/DL
COLOR UR AUTO: NORMAL
GLUCOSE UR STRIP.AUTO-MCNC: NEGATIVE MG/DL
KETONES UR STRIP.AUTO-MCNC: NEGATIVE MG/DL
LEUKOCYTE ESTERASE UR QL STRIP.AUTO: NORMAL
NITRITE UR QL STRIP.AUTO: NEGATIVE
PH UR STRIP.AUTO: 7 [PH] (ref 5–8)
PROT UR QL STRIP: NEGATIVE MG/DL
RBC UR QL AUTO: NORMAL
SP GR UR STRIP.AUTO: 1.02
UROBILINOGEN UR STRIP-MCNC: NORMAL MG/DL

## 2018-12-13 PROCEDURE — 90040 PR PRENATAL FOLLOW UP: CPT | Performed by: NURSE PRACTITIONER

## 2018-12-13 PROCEDURE — 81002 URINALYSIS NONAUTO W/O SCOPE: CPT | Performed by: NURSE PRACTITIONER

## 2018-12-13 NOTE — PROGRESS NOTES
SUBJECTIVE:  Pt is a 33 y.o.   at 34w5d  gestation. Presents today for follow-up prenatal care. Reports no issues at this time.  Reports good  fetal movement. Denies cramping/contractions, bleeding or leaking of fluid. Denies dysuria, headaches, N/V, or other issues at this time. Generally feels well today. Reports light pink 1-2 times when she wiped, no recent sex. No burning with urination or other symptoms.     OBJECTIVE:  - See prenatal vitals flow  Vitals:    18 1123   BP: 108/70   Weight: 68.5 kg (151 lb)                 ASSESSMENT:   - IUP at 34w5d    - S=D   -   Patient Active Problem List    Diagnosis Date Noted   • Supervision of other normal pregnancy 2018         PLAN:  - S/sx pregnancy and labor warning signs vs general discomforts discussed  - Fetal movements and kick counts reviewed   - Adequate hydration reinforced  - To advise if blood changing or related to other symptoms   - Nutrition/exercise/vitamin education; continued PNV  - S/p TDAP vacc   - S/p Flu vacc   - Anticipatory guidance given  - RTC in 2 weeks for follow-up prenatal care

## 2018-12-13 NOTE — PROGRESS NOTES
Pt here today for OB follow up  Pt states blood tinged discharge  Reports +FM  Good # [phone]  Pharmacy Confirmed.  Chaperone offered and none needed.

## 2018-12-19 ENCOUNTER — TELEPHONE (OUTPATIENT)
Dept: OBGYN | Facility: CLINIC | Age: 33
End: 2018-12-19

## 2018-12-19 ENCOUNTER — HOSPITAL ENCOUNTER (OUTPATIENT)
Facility: MEDICAL CENTER | Age: 33
End: 2018-12-19
Attending: OBSTETRICS & GYNECOLOGY | Admitting: OBSTETRICS & GYNECOLOGY

## 2018-12-19 ENCOUNTER — APPOINTMENT (OUTPATIENT)
Dept: RADIOLOGY | Facility: MEDICAL CENTER | Age: 33
End: 2018-12-19
Attending: OBSTETRICS & GYNECOLOGY

## 2018-12-19 VITALS
SYSTOLIC BLOOD PRESSURE: 94 MMHG | HEART RATE: 82 BPM | DIASTOLIC BLOOD PRESSURE: 58 MMHG | TEMPERATURE: 98.6 F | HEIGHT: 64 IN | BODY MASS INDEX: 25.78 KG/M2 | WEIGHT: 151 LBS

## 2018-12-19 PROCEDURE — 59025 FETAL NON-STRESS TEST: CPT

## 2018-12-19 PROCEDURE — 76815 OB US LIMITED FETUS(S): CPT

## 2018-12-19 NOTE — TELEPHONE ENCOUNTER
Pt came in to [facility] c/o vaginal bleeding with mucous since yesterday and UC about an hr apart. Bleeding has not decreased since yesterday. Pt denies any LOF and reports good FM. Per consultation with midwife Alma Mondragon, pt needs to go to L&D for further evaluation. Pt notified, voiced understanding and will comply. Pt had no other questions or concerns

## 2018-12-19 NOTE — PROGRESS NOTES
"34yo, , edc1/19, 35.4 presents with c/o bleeding since last night at 2100. Pt denies LOF. POS fm. EFM and Wiseman placed. VSS.  Pt states that she had a little bright red bleeding last night and \"just a little\" today. ROSHAN Benson, LOTTIEM, at bedside. SSE, no blood seen. SVE Ft//2, vertex, intact. Small amount of old brown blood noted on glove. US ordered.   US here, See results.   1330 Dr. York updated with US results. Okay to discharge to home.   1345  Discharge instructions given, pt states understanding. Reactive strip obtained. Pt discharged to home  in stable condition, ambulatory, with family.   "

## 2018-12-27 ENCOUNTER — ROUTINE PRENATAL (OUTPATIENT)
Dept: OBGYN | Facility: CLINIC | Age: 33
End: 2018-12-27

## 2018-12-27 ENCOUNTER — HOSPITAL ENCOUNTER (OUTPATIENT)
Facility: MEDICAL CENTER | Age: 33
End: 2018-12-27
Attending: NURSE PRACTITIONER
Payer: COMMERCIAL

## 2018-12-27 VITALS — WEIGHT: 153 LBS | SYSTOLIC BLOOD PRESSURE: 104 MMHG | DIASTOLIC BLOOD PRESSURE: 68 MMHG | BODY MASS INDEX: 26.26 KG/M2

## 2018-12-27 DIAGNOSIS — Z34.83 ENCOUNTER FOR SUPERVISION OF OTHER NORMAL PREGNANCY, THIRD TRIMESTER: ICD-10-CM

## 2018-12-27 PROCEDURE — 90040 PR PRENATAL FOLLOW UP: CPT | Performed by: NURSE PRACTITIONER

## 2018-12-27 NOTE — PROGRESS NOTES
SUBJECTIVE:  Pt is a 33 y.o.   at 36w5d  gestation. Presents today for follow-up prenatal care. Reports no issues at this time. Was seen on L&D for spotting on  with ultrasound done. Only small amount of old blood seen in vaginal vault. Patient states has had no additional spotting.  Reports good  fetal movement. Denies cramping/contractions, bleeding or leaking of fluid. Denies dysuria, headaches, N/V, or other issues at this time. Generally feels well today.     OBJECTIVE:  - See prenatal vitals flow  -   Vitals:    18 0938   BP: 104/68   Weight: 69.4 kg (153 lb)      Labs - normal prenatal labs  US          Narrative       2018 11:55 AM    HISTORY/REASON FOR EXAM:  Vaginal bleeding, evaluate RAFAEL and placenta    TECHNIQUE/EXAM DESCRIPTION: OB limited ultrasound.    COMPARISON:  Obstetrical ultrasound 9/10/2018    FINDINGS:  Fetal Lie:  Cephalic, spine maternal left  LMP:  2018  Clinical RYAN by LMP:  2019    Placenta (Location):  Fundal  Placenta Previa: No  Placental Grade: II    No retroplacental hematoma.    Amniotic Fluid Volume:  RAFAEL = 14.3 cm    Fetal Heart Rate:  150 bpm    Cervical Length:  3.49 cm transabdominal    No maternal adnexal mass is identified.    RYAN by 1st US:  2019   Impression       Single intrauterine pregnancy with normal RAFAEL, heart rate and no retroplacental hematoma is identified    INTERPRETING LOCATION: [address] [place] [address], [facility]       ASSESSMENT:   - IUP at 36w5d    - S=D    Adequate growth noted on US   -   Patient Active Problem List    Diagnosis Date Noted   • Supervision of other normal pregnancy 2018         PLAN:  - S/sx pregnancy and labor warning signs vs general discomforts discussed  - Fetal movements and kick counts reviewed   - Adequate hydration reinforced  - Nutrition/exercise/vitamin education; continued PNV  - GBS done today  Start weekly visits.   Declines contraception.

## 2018-12-27 NOTE — PROGRESS NOTES
Pt here today for OB follow up  Pt states no complaints   Reports +  Good # [phone]  Pharmacy Confirmed.  Chaperone offered and declined.   GBS today.  Pt seen in L&D for VB.

## 2018-12-29 LAB — GP B STREP DNA SPEC QL NAA+PROBE: POSITIVE

## 2019-01-03 ENCOUNTER — ROUTINE PRENATAL (OUTPATIENT)
Dept: OBGYN | Facility: CLINIC | Age: 34
End: 2019-01-03

## 2019-01-03 VITALS — WEIGHT: 156 LBS | SYSTOLIC BLOOD PRESSURE: 100 MMHG | DIASTOLIC BLOOD PRESSURE: 60 MMHG | BODY MASS INDEX: 26.78 KG/M2

## 2019-01-03 DIAGNOSIS — B95.1 GROUP BETA STREP POSITIVE: ICD-10-CM

## 2019-01-03 PROCEDURE — 90040 PR PRENATAL FOLLOW UP: CPT | Performed by: NURSE PRACTITIONER

## 2019-01-03 NOTE — PROGRESS NOTES
Pt. Here for OB/FU today. Reports Good FM.   Good # [phone]  Pt states has had a pinkish discharge that comes and goes.   Pharmacy verified.

## 2019-01-03 NOTE — PROGRESS NOTES
SUBJECTIVE:  Pt is a 33 y.o.   at 37w5d  gestation. Presents today for follow-up prenatal care. Reports no issues at this time.  Reports good fetal movement. Denies cramping/contractions, bleeding or leaking of fluid. Denies dysuria, headaches, N/V, or other issues at this time. Generally feels well today. Reports one episode last week of light pink spotting with wiping.     OBJECTIVE:  - See prenatal vitals flow  -   Vitals:    19 1333   BP: 100/60   Weight: 70.8 kg (156 lb)                 ASSESSMENT:   - IUP at 37w5d    - S=D   -   Patient Active Problem List    Diagnosis Date Noted   • Group beta Strep positive 2019   • Supervision of other normal pregnancy 2018         PLAN:  - S/sx pregnancy and labor warning signs vs general discomforts discussed  - Fetal movements and kick counts reviewed   - Adequate hydration reinforced  - Nutrition/exercise/vitamin education; continued PNV  - Plans IUD for contraception Pp: reviewed need to go to other clinic due to insurance with out of pocket fees here  - Coping with labor pains reviewed and resources provided   - S/p TDAP vacc   - S/p Flu vacc   - Anticipatory guidance given  - reviewed GBS positive status   - RTC in 1 weeks for follow-up prenatal care

## 2019-01-10 ENCOUNTER — ROUTINE PRENATAL (OUTPATIENT)
Dept: OBGYN | Facility: CLINIC | Age: 34
End: 2019-01-10

## 2019-01-10 VITALS — BODY MASS INDEX: 26.95 KG/M2 | SYSTOLIC BLOOD PRESSURE: 100 MMHG | WEIGHT: 157 LBS | DIASTOLIC BLOOD PRESSURE: 60 MMHG

## 2019-01-10 DIAGNOSIS — Z34.83 NORMAL PREGNANCY IN MULTIGRAVIDA IN THIRD TRIMESTER: ICD-10-CM

## 2019-01-10 PROCEDURE — 90040 PR PRENATAL FOLLOW UP: CPT | Performed by: ADVANCED PRACTICE MIDWIFE

## 2019-01-10 NOTE — PROGRESS NOTES
Pt here today for OB follow up  Pt states some UC's  Reports +FM  Good # [phone]  Pharmacy Confirmed.  Chaperone offered and none needed.   GBS +

## 2019-01-10 NOTE — PROGRESS NOTES
SUBJECTIVE:  Pt is a 33 y.o.   at 38w5d  gestation. Presents today for follow-up prenatal care. Reports no issues at this time.  Reports good  fetal movement. Denies cramping/contractions or leaking of fluid. Denies dysuria, headaches, N/V, or other issues at this time. Generally feels well today. Patient has pictures today of some bloody discharge.     OBJECTIVE:  - See prenatal vitals flow  -   Vitals:    01/10/19 0902   BP: 100/60   Weight: 71.2 kg (157 lb)                 ASSESSMENT:   - IUP at 38w5d    - S=D   -   Patient Active Problem List    Diagnosis Date Noted   • Group beta Strep positive 2019   • Supervision of other normal pregnancy 2018         PLAN:  - S/sx pregnancy and labor warning signs vs general discomforts discussed  - Fetal movements and kick counts reviewed   - Adequate hydration reinforced  - Plans for  breastfeeding; discussed in detail with patient.   - Will defer scheduling IOL at this time as I discussed current cervical dilation with patient. If pregnant in one, consider membrane sweep for labor. She agrees with plan.   - Anticipatory guidance provided especially in light of GBS positive status. She verbalizes understanding  - RTC in 1 weeks for routine prenatal care.

## 2019-01-12 ENCOUNTER — HOSPITAL ENCOUNTER (INPATIENT)
Facility: MEDICAL CENTER | Age: 34
LOS: 2 days | End: 2019-01-14
Attending: OBSTETRICS & GYNECOLOGY | Admitting: OBSTETRICS & GYNECOLOGY
Payer: MEDICAID

## 2019-01-12 LAB
BASOPHILS # BLD AUTO: 0.2 % (ref 0–1.8)
BASOPHILS # BLD: 0.02 K/UL (ref 0–0.12)
EOSINOPHIL # BLD AUTO: 0.08 K/UL (ref 0–0.51)
EOSINOPHIL NFR BLD: 1 % (ref 0–6.9)
ERYTHROCYTE [DISTWIDTH] IN BLOOD BY AUTOMATED COUNT: 45.5 FL (ref 35.9–50)
HCT VFR BLD AUTO: 38.2 % (ref 37–47)
HGB BLD-MCNC: 12.7 G/DL (ref 12–16)
HOLDING TUBE BB 8507: NORMAL
IMM GRANULOCYTES # BLD AUTO: 0.04 K/UL (ref 0–0.11)
IMM GRANULOCYTES NFR BLD AUTO: 0.5 % (ref 0–0.9)
LYMPHOCYTES # BLD AUTO: 1.94 K/UL (ref 1–4.8)
LYMPHOCYTES NFR BLD: 23.2 % (ref 22–41)
MCH RBC QN AUTO: 30.4 PG (ref 27–33)
MCHC RBC AUTO-ENTMCNC: 33.2 G/DL (ref 33.6–35)
MCV RBC AUTO: 91.4 FL (ref 81.4–97.8)
MONOCYTES # BLD AUTO: 0.94 K/UL (ref 0–0.85)
MONOCYTES NFR BLD AUTO: 11.2 % (ref 0–13.4)
NEUTROPHILS # BLD AUTO: 5.34 K/UL (ref 2–7.15)
NEUTROPHILS NFR BLD: 63.9 % (ref 44–72)
NRBC # BLD AUTO: 0 K/UL
NRBC BLD-RTO: 0 /100 WBC
PLATELET # BLD AUTO: 342 K/UL (ref 164–446)
PMV BLD AUTO: 9.8 FL (ref 9–12.9)
RBC # BLD AUTO: 4.18 M/UL (ref 4.2–5.4)
WBC # BLD AUTO: 8.4 K/UL (ref 4.8–10.8)

## 2019-01-12 PROCEDURE — 85025 COMPLETE CBC W/AUTO DIFF WBC: CPT

## 2019-01-12 PROCEDURE — 700105 HCHG RX REV CODE 258: Performed by: NURSE PRACTITIONER

## 2019-01-12 PROCEDURE — 770002 HCHG ROOM/CARE - OB PRIVATE (112)

## 2019-01-12 PROCEDURE — 700111 HCHG RX REV CODE 636 W/ 250 OVERRIDE (IP): Performed by: NURSE PRACTITIONER

## 2019-01-12 RX ORDER — DEXTROSE, SODIUM CHLORIDE, SODIUM LACTATE, POTASSIUM CHLORIDE, AND CALCIUM CHLORIDE 5; .6; .31; .03; .02 G/100ML; G/100ML; G/100ML; G/100ML; G/100ML
INJECTION, SOLUTION INTRAVENOUS CONTINUOUS
Status: DISCONTINUED | OUTPATIENT
Start: 2019-01-13 | End: 2019-01-14 | Stop reason: HOSPADM

## 2019-01-12 RX ORDER — SODIUM CHLORIDE, SODIUM LACTATE, POTASSIUM CHLORIDE, CALCIUM CHLORIDE 600; 310; 30; 20 MG/100ML; MG/100ML; MG/100ML; MG/100ML
INJECTION, SOLUTION INTRAVENOUS CONTINUOUS
Status: DISPENSED | OUTPATIENT
Start: 2019-01-12 | End: 2019-01-13

## 2019-01-12 RX ORDER — METHYLERGONOVINE MALEATE 0.2 MG/ML
0.2 INJECTION INTRAVENOUS
Status: DISCONTINUED | OUTPATIENT
Start: 2019-01-12 | End: 2019-01-13 | Stop reason: HOSPADM

## 2019-01-12 RX ORDER — ALUMINA, MAGNESIA, AND SIMETHICONE 2400; 2400; 240 MG/30ML; MG/30ML; MG/30ML
30 SUSPENSION ORAL EVERY 6 HOURS PRN
Status: DISCONTINUED | OUTPATIENT
Start: 2019-01-12 | End: 2019-01-13 | Stop reason: HOSPADM

## 2019-01-12 RX ORDER — MISOPROSTOL 200 UG/1
800 TABLET ORAL
Status: COMPLETED | OUTPATIENT
Start: 2019-01-12 | End: 2019-01-13

## 2019-01-12 RX ADMIN — SODIUM CHLORIDE, POTASSIUM CHLORIDE, SODIUM LACTATE AND CALCIUM CHLORIDE: 600; 310; 30; 20 INJECTION, SOLUTION INTRAVENOUS at 22:30

## 2019-01-12 RX ADMIN — AMPICILLIN SODIUM 2 G: 2 INJECTION, POWDER, FOR SOLUTION INTRAMUSCULAR; INTRAVENOUS at 22:31

## 2019-01-12 ASSESSMENT — PATIENT HEALTH QUESTIONNAIRE - PHQ9
1. LITTLE INTEREST OR PLEASURE IN DOING THINGS: NOT AT ALL
SUM OF ALL RESPONSES TO PHQ9 QUESTIONS 1 AND 2: 0
2. FEELING DOWN, DEPRESSED, IRRITABLE, OR HOPELESS: NOT AT ALL

## 2019-01-12 ASSESSMENT — LIFESTYLE VARIABLES: EVER_SMOKED: NEVER

## 2019-01-13 LAB
BASOPHILS # BLD AUTO: 0.4 % (ref 0–1.8)
BASOPHILS # BLD: 0.09 K/UL (ref 0–0.12)
EOSINOPHIL # BLD AUTO: 0.01 K/UL (ref 0–0.51)
EOSINOPHIL NFR BLD: 0 % (ref 0–6.9)
ERYTHROCYTE [DISTWIDTH] IN BLOOD BY AUTOMATED COUNT: 46.3 FL (ref 35.9–50)
ERYTHROCYTE [DISTWIDTH] IN BLOOD BY AUTOMATED COUNT: 47 FL (ref 35.9–50)
HCT VFR BLD AUTO: 27 % (ref 37–47)
HCT VFR BLD AUTO: 27.9 % (ref 37–47)
HGB BLD-MCNC: 8.9 G/DL (ref 12–16)
HGB BLD-MCNC: 9 G/DL (ref 12–16)
IMM GRANULOCYTES # BLD AUTO: 0.13 K/UL (ref 0–0.11)
IMM GRANULOCYTES NFR BLD AUTO: 0.6 % (ref 0–0.9)
LYMPHOCYTES # BLD AUTO: 1.44 K/UL (ref 1–4.8)
LYMPHOCYTES NFR BLD: 6.4 % (ref 22–41)
MCH RBC QN AUTO: 30.3 PG (ref 27–33)
MCH RBC QN AUTO: 30.8 PG (ref 27–33)
MCHC RBC AUTO-ENTMCNC: 32 G/DL (ref 33.6–35)
MCHC RBC AUTO-ENTMCNC: 33 G/DL (ref 33.6–35)
MCV RBC AUTO: 93.4 FL (ref 81.4–97.8)
MCV RBC AUTO: 94.6 FL (ref 81.4–97.8)
MONOCYTES # BLD AUTO: 1.77 K/UL (ref 0–0.85)
MONOCYTES NFR BLD AUTO: 7.8 % (ref 0–13.4)
NEUTROPHILS # BLD AUTO: 19.22 K/UL (ref 2–7.15)
NEUTROPHILS NFR BLD: 84.8 % (ref 44–72)
NRBC # BLD AUTO: 0 K/UL
NRBC BLD-RTO: 0 /100 WBC
PLATELET # BLD AUTO: 265 K/UL (ref 164–446)
PLATELET # BLD AUTO: 276 K/UL (ref 164–446)
PMV BLD AUTO: 10 FL (ref 9–12.9)
PMV BLD AUTO: 9.9 FL (ref 9–12.9)
RBC # BLD AUTO: 2.89 M/UL (ref 4.2–5.4)
RBC # BLD AUTO: 2.94 M/UL (ref 4.2–5.4)
WBC # BLD AUTO: 17.7 K/UL (ref 4.8–10.8)
WBC # BLD AUTO: 22.7 K/UL (ref 4.8–10.8)

## 2019-01-13 PROCEDURE — A9270 NON-COVERED ITEM OR SERVICE: HCPCS | Performed by: OBSTETRICS & GYNECOLOGY

## 2019-01-13 PROCEDURE — 770002 HCHG ROOM/CARE - OB PRIVATE (112)

## 2019-01-13 PROCEDURE — 85027 COMPLETE CBC AUTOMATED: CPT

## 2019-01-13 PROCEDURE — 59409 OBSTETRICAL CARE: CPT

## 2019-01-13 PROCEDURE — 0KQM0ZZ REPAIR PERINEUM MUSCLE, OPEN APPROACH: ICD-10-PCS | Performed by: OBSTETRICS & GYNECOLOGY

## 2019-01-13 PROCEDURE — A9270 NON-COVERED ITEM OR SERVICE: HCPCS | Performed by: STUDENT IN AN ORGANIZED HEALTH CARE EDUCATION/TRAINING PROGRAM

## 2019-01-13 PROCEDURE — 59409 OBSTETRICAL CARE: CPT | Performed by: OBSTETRICS & GYNECOLOGY

## 2019-01-13 PROCEDURE — 304965 HCHG RECOVERY SERVICES

## 2019-01-13 PROCEDURE — 36415 COLL VENOUS BLD VENIPUNCTURE: CPT

## 2019-01-13 PROCEDURE — A9270 NON-COVERED ITEM OR SERVICE: HCPCS | Performed by: NURSE PRACTITIONER

## 2019-01-13 PROCEDURE — 700102 HCHG RX REV CODE 250 W/ 637 OVERRIDE(OP): Performed by: NURSE PRACTITIONER

## 2019-01-13 PROCEDURE — 303615 HCHG EPIDURAL/SPINAL ANESTHESIA FOR LABOR

## 2019-01-13 PROCEDURE — 700102 HCHG RX REV CODE 250 W/ 637 OVERRIDE(OP): Performed by: OBSTETRICS & GYNECOLOGY

## 2019-01-13 PROCEDURE — 700111 HCHG RX REV CODE 636 W/ 250 OVERRIDE (IP)

## 2019-01-13 PROCEDURE — 700105 HCHG RX REV CODE 258

## 2019-01-13 PROCEDURE — 85025 COMPLETE CBC W/AUTO DIFF WBC: CPT

## 2019-01-13 PROCEDURE — 700111 HCHG RX REV CODE 636 W/ 250 OVERRIDE (IP): Performed by: NURSE PRACTITIONER

## 2019-01-13 PROCEDURE — 700105 HCHG RX REV CODE 258: Performed by: NURSE PRACTITIONER

## 2019-01-13 PROCEDURE — 700112 HCHG RX REV CODE 229: Performed by: STUDENT IN AN ORGANIZED HEALTH CARE EDUCATION/TRAINING PROGRAM

## 2019-01-13 PROCEDURE — 700102 HCHG RX REV CODE 250 W/ 637 OVERRIDE(OP): Performed by: STUDENT IN AN ORGANIZED HEALTH CARE EDUCATION/TRAINING PROGRAM

## 2019-01-13 RX ORDER — SODIUM CHLORIDE, SODIUM LACTATE, POTASSIUM CHLORIDE, AND CALCIUM CHLORIDE .6; .31; .03; .02 G/100ML; G/100ML; G/100ML; G/100ML
1000 INJECTION, SOLUTION INTRAVENOUS
Status: DISCONTINUED | OUTPATIENT
Start: 2019-01-13 | End: 2019-01-13 | Stop reason: HOSPADM

## 2019-01-13 RX ORDER — FERROUS GLUCONATE 324(38)MG
324 TABLET ORAL 2 TIMES DAILY
Status: DISCONTINUED | OUTPATIENT
Start: 2019-01-13 | End: 2019-01-14 | Stop reason: HOSPADM

## 2019-01-13 RX ORDER — SODIUM CHLORIDE, SODIUM LACTATE, POTASSIUM CHLORIDE, CALCIUM CHLORIDE 600; 310; 30; 20 MG/100ML; MG/100ML; MG/100ML; MG/100ML
INJECTION, SOLUTION INTRAVENOUS PRN
Status: DISCONTINUED | OUTPATIENT
Start: 2019-01-13 | End: 2019-01-14 | Stop reason: HOSPADM

## 2019-01-13 RX ORDER — ROPIVACAINE HYDROCHLORIDE 2 MG/ML
INJECTION, SOLUTION EPIDURAL; INFILTRATION; PERINEURAL CONTINUOUS
Status: DISCONTINUED | OUTPATIENT
Start: 2019-01-13 | End: 2019-01-14 | Stop reason: HOSPADM

## 2019-01-13 RX ORDER — ONDANSETRON 2 MG/ML
4 INJECTION INTRAMUSCULAR; INTRAVENOUS EVERY 6 HOURS PRN
Status: DISCONTINUED | OUTPATIENT
Start: 2019-01-13 | End: 2019-01-14 | Stop reason: HOSPADM

## 2019-01-13 RX ORDER — IBUPROFEN 800 MG/1
800 TABLET ORAL EVERY 8 HOURS PRN
Status: DISCONTINUED | OUTPATIENT
Start: 2019-01-13 | End: 2019-01-14 | Stop reason: HOSPADM

## 2019-01-13 RX ORDER — OXYCODONE AND ACETAMINOPHEN 10; 325 MG/1; MG/1
1 TABLET ORAL EVERY 4 HOURS PRN
Status: DISCONTINUED | OUTPATIENT
Start: 2019-01-13 | End: 2019-01-14 | Stop reason: HOSPADM

## 2019-01-13 RX ORDER — OXYCODONE HYDROCHLORIDE AND ACETAMINOPHEN 5; 325 MG/1; MG/1
1 TABLET ORAL EVERY 4 HOURS PRN
Status: DISCONTINUED | OUTPATIENT
Start: 2019-01-13 | End: 2019-01-14 | Stop reason: HOSPADM

## 2019-01-13 RX ORDER — BISACODYL 10 MG
10 SUPPOSITORY, RECTAL RECTAL PRN
Status: DISCONTINUED | OUTPATIENT
Start: 2019-01-13 | End: 2019-01-14 | Stop reason: HOSPADM

## 2019-01-13 RX ORDER — ACETAMINOPHEN 325 MG/1
325 TABLET ORAL EVERY 4 HOURS PRN
Status: DISCONTINUED | OUTPATIENT
Start: 2019-01-13 | End: 2019-01-14 | Stop reason: HOSPADM

## 2019-01-13 RX ORDER — SODIUM CHLORIDE, SODIUM LACTATE, POTASSIUM CHLORIDE, CALCIUM CHLORIDE 600; 310; 30; 20 MG/100ML; MG/100ML; MG/100ML; MG/100ML
INJECTION, SOLUTION INTRAVENOUS
Status: COMPLETED
Start: 2019-01-13 | End: 2019-01-13

## 2019-01-13 RX ORDER — VITAMIN A ACETATE, BETA CAROTENE, ASCORBIC ACID, CHOLECALCIFEROL, .ALPHA.-TOCOPHEROL ACETATE, DL-, THIAMINE MONONITRATE, RIBOFLAVIN, NIACINAMIDE, PYRIDOXINE HYDROCHLORIDE, FOLIC ACID, CYANOCOBALAMIN, CALCIUM CARBONATE, FERROUS FUMARATE, ZINC OXIDE, CUPRIC OXIDE 3080; 12; 120; 400; 1; 1.84; 3; 20; 22; 920; 25; 200; 27; 10; 2 [IU]/1; UG/1; MG/1; [IU]/1; MG/1; MG/1; MG/1; MG/1; MG/1; [IU]/1; MG/1; MG/1; MG/1; MG/1; MG/1
1 TABLET, FILM COATED ORAL EVERY MORNING
Status: DISCONTINUED | OUTPATIENT
Start: 2019-01-13 | End: 2019-01-14 | Stop reason: HOSPADM

## 2019-01-13 RX ORDER — ROPIVACAINE HYDROCHLORIDE 2 MG/ML
INJECTION, SOLUTION EPIDURAL; INFILTRATION; PERINEURAL
Status: COMPLETED
Start: 2019-01-13 | End: 2019-01-13

## 2019-01-13 RX ORDER — ONDANSETRON 4 MG/1
4 TABLET, ORALLY DISINTEGRATING ORAL EVERY 6 HOURS PRN
Status: DISCONTINUED | OUTPATIENT
Start: 2019-01-13 | End: 2019-01-14 | Stop reason: HOSPADM

## 2019-01-13 RX ORDER — DOCUSATE SODIUM 100 MG/1
100 CAPSULE, LIQUID FILLED ORAL 2 TIMES DAILY PRN
Status: DISCONTINUED | OUTPATIENT
Start: 2019-01-13 | End: 2019-01-14 | Stop reason: HOSPADM

## 2019-01-13 RX ORDER — SODIUM CHLORIDE, SODIUM LACTATE, POTASSIUM CHLORIDE, AND CALCIUM CHLORIDE .6; .31; .03; .02 G/100ML; G/100ML; G/100ML; G/100ML
250 INJECTION, SOLUTION INTRAVENOUS PRN
Status: DISCONTINUED | OUTPATIENT
Start: 2019-01-13 | End: 2019-01-13 | Stop reason: HOSPADM

## 2019-01-13 RX ORDER — SODIUM CHLORIDE, SODIUM LACTATE, POTASSIUM CHLORIDE, CALCIUM CHLORIDE 600; 310; 30; 20 MG/100ML; MG/100ML; MG/100ML; MG/100ML
1000 INJECTION, SOLUTION INTRAVENOUS CONTINUOUS
Status: DISCONTINUED | OUTPATIENT
Start: 2019-01-13 | End: 2019-01-14 | Stop reason: HOSPADM

## 2019-01-13 RX ADMIN — DOCUSATE SODIUM 100 MG: 100 CAPSULE, LIQUID FILLED ORAL at 14:04

## 2019-01-13 RX ADMIN — SODIUM CHLORIDE, POTASSIUM CHLORIDE, SODIUM LACTATE AND CALCIUM CHLORIDE 1000 ML: 600; 310; 30; 20 INJECTION, SOLUTION INTRAVENOUS at 11:25

## 2019-01-13 RX ADMIN — Medication 125 ML/HR: at 09:57

## 2019-01-13 RX ADMIN — SODIUM CHLORIDE, POTASSIUM CHLORIDE, SODIUM LACTATE AND CALCIUM CHLORIDE: 600; 310; 30; 20 INJECTION, SOLUTION INTRAVENOUS at 05:00

## 2019-01-13 RX ADMIN — Medication 1 MILLI-UNITS/MIN: at 02:59

## 2019-01-13 RX ADMIN — ROPIVACAINE HYDROCHLORIDE 100 ML: 2 INJECTION, SOLUTION EPIDURAL; INFILTRATION; PERINEURAL at 05:18

## 2019-01-13 RX ADMIN — Medication 1 TABLET: at 14:04

## 2019-01-13 RX ADMIN — IBUPROFEN 800 MG: 800 TABLET, FILM COATED ORAL at 14:04

## 2019-01-13 RX ADMIN — SODIUM CHLORIDE, SODIUM LACTATE, POTASSIUM CHLORIDE, CALCIUM CHLORIDE 1000 ML: 600; 310; 30; 20 INJECTION, SOLUTION INTRAVENOUS at 11:25

## 2019-01-13 RX ADMIN — FERROUS GLUCONATE 324 MG: 324 TABLET ORAL at 14:04

## 2019-01-13 RX ADMIN — AMPICILLIN SODIUM 1 G: 1 INJECTION, POWDER, FOR SOLUTION INTRAMUSCULAR; INTRAVENOUS at 07:04

## 2019-01-13 RX ADMIN — SODIUM CHLORIDE, POTASSIUM CHLORIDE, SODIUM LACTATE AND CALCIUM CHLORIDE: 600; 310; 30; 20 INJECTION, SOLUTION INTRAVENOUS at 07:24

## 2019-01-13 RX ADMIN — ROPIVACAINE HYDROCHLORIDE 100 ML: 2 INJECTION, SOLUTION EPIDURAL; INFILTRATION at 05:18

## 2019-01-13 RX ADMIN — AMPICILLIN SODIUM 1 G: 1 INJECTION, POWDER, FOR SOLUTION INTRAMUSCULAR; INTRAVENOUS at 02:57

## 2019-01-13 RX ADMIN — MISOPROSTOL 800 MCG: 200 TABLET ORAL at 09:30

## 2019-01-13 RX ADMIN — IBUPROFEN 800 MG: 800 TABLET, FILM COATED ORAL at 22:43

## 2019-01-13 RX ADMIN — OXYCODONE AND ACETAMINOPHEN 1 TABLET: 5; 325 TABLET ORAL at 22:43

## 2019-01-13 ASSESSMENT — PAIN SCALES - GENERAL
PAINLEVEL_OUTOF10: 0
PAINLEVEL_OUTOF10: 4
PAINLEVEL_OUTOF10: 0
PAINLEVEL_OUTOF10: 6
PAINLEVEL_OUTOF10: 5

## 2019-01-13 ASSESSMENT — PATIENT HEALTH QUESTIONNAIRE - PHQ9
2. FEELING DOWN, DEPRESSED, IRRITABLE, OR HOPELESS: NOT AT ALL
1. LITTLE INTEREST OR PLEASURE IN DOING THINGS: NOT AT ALL
SUM OF ALL RESPONSES TO PHQ9 QUESTIONS 1 AND 2: 0

## 2019-01-13 NOTE — CARE PLAN
Problem: Infection  Goal: Will remain free from infection    Intervention: Implement standard precautions and perform hand washing before and after patient contact  Hands washed appropriately      Problem: Knowledge Deficit  Goal: Knowledge of disease process/condition, treatment plan, diagnostic tests, and medications will improve    Intervention: Explain information regarding disease process/condition, treatment plan, diagnostic tests, and medications and document in education  Discussed POC with patient

## 2019-01-13 NOTE — DISCHARGE PLANNING
Medical Social Work    Referral: Patient is asking about DNA     Intervention: Pt was given paternity testing information    Plan: Pt reports no further needs or questions.

## 2019-01-13 NOTE — PROGRESS NOTES
Bedside report received from Mile RN @ [id number]. Verified baby's wrist band with the patient and patient's sister. Cuddle is active. Oriented patient to the room, introduced the call light. Discussed plan of care. Assessment done. Instructed patient to call when ready to go to the bathroom. Encouraged to call if with need. Will check at intervals.

## 2019-01-13 NOTE — H&P
History and Physical    Michelle Adame is a 33 y.o. female  -Para:     Gestational Age:  39w0d  Admitted for:   Active Labor  Admitted to  [facility].  [facility]    HPI: Patient is admitted with the above mentioned Chief Complaint and States   Loss of fluid:   negative  Abdominal Pain:  negative  Uterine Contractions:  positive  Vaginal Bleeding:  negative  Fetal Movement:  normal  Patient denies fever, chills, nausea, vomiting , headache, visual disturbance, or dysuria  No LMP recorded. Patient is pregnant.  Estimated Date of Delivery: 19  Final RYAN: 2019, by Ultrasound    Patient Active Problem List    Diagnosis Date Noted   • Group beta Strep positive 2019   • Supervision of other normal pregnancy 2018       Admitting DX: Pregnancy  Pregnancy Complications:  none  OB Risk Factors:   group B strep colonizer  Labor State:    Active phase labor.    History:   has no past medical history of Addisons disease ([facility]); Adrenal disorder ([facility]); Allergy; Anemia; Anxiety; Arrhythmia; Arthritis; Asthma; Blood transfusion without reported diagnosis; Cancer ([facility]); Cataract; CHF (congestive heart failure) ([facility]); Clotting disorder ([facility]); COPD (chronic obstructive pulmonary disease) ([facility]); Cushings syndrome ([facility]); Depression; Diabetes ([facility]); Diabetic neuropathy ([facility]); GERD (gastroesophageal reflux disease); Glaucoma; Goiter; Head ache; Heart attack ([facility]); Heart murmur; HIV (human immunodeficiency virus infection) ([facility]); Hyperlipidemia; Hypertension; IBD (inflammatory bowel disease); Kidney disease; Meningitis; Migraine; Muscle disorder; Osteoporosis; Parathyroid disorder ([facility]); Pituitary disease ([facility]); Pulmonary emphysema ([facility]); Seizure ([facility]); Sickle cell disease ([facility]); Stroke ([facility]); Substance abuse ([facility]); Thyroid disease; Tuberculosis; or Urinary tract infection.     has a past surgical history that includes amelie by  "laparoscopy (2017).    OB History    Para Term  AB Living   2 1 1     1   SAB TAB Ectopic Molar Multiple Live Births             1      # Outcome Date GA Lbr Vincent/2nd Weight Sex Delivery Anes PTL Lv   2 Current            1 Term 09 40w0d  3.175 kg (7 lb) F Vag-Spont None N MARTHA      Birth Comments: Pt states no complications          Medications:  No current facility-administered medications on file prior to encounter.      Current Outpatient Prescriptions on File Prior to Encounter   Medication Sig Dispense Refill   • olopatadine (PATANOL) 0.1 % ophthalmic solution INSTILL 1 DROP INTO BOTH EYES TWICE A DAY  2   • Prenatal MV-Min-Fe Fum-FA-DHA (PRENATAL 1 PO) Take  by mouth.     • PRENATAL VIT-MIN-FA-FISH OIL PO Take  by mouth.         Allergies:  Patient has no known allergies.    ROS:   Neuro: negative    Cardiovascular: negative  Gastro intestinal: negative  Genitourinary: negative            Physical Exam:  /74   Pulse 83   Temp 36.7 °C (98 °F) (Temporal)   Ht 1.575 m (5' 2\")   Wt 71.2 kg (157 lb)   BMI 28.72 kg/m²   Constitutional: healthy-appearing, Well-developed, well-nourished, in no acute distress  No JVD: while supine  HEENT: EOMI, Neck supple with midline trachea and Thyroid without masses  Breast Exam: negative  Cardio: regular rate and rhythm  Lung: unlabored respirations, no intercostal retractions or accessory muscle use, clear to auscultation without rales or wheezes  Abdomen: abdomen is soft without significant tenderness, masses, organomegaly or guarding  Extremity: extremities, peripheral pulses and reflexes normal, no edema, redness or tenderness in the calves or thighs, Homans sign is negative, no sign of DVT, feet normal, good pulses, normal color, temperature and sensation    Cervical Exam: 80%  Cervix Dilatation: 5  Station: negative 2  Pelvis: Adequate  Fetal Assessment: Fetal heart variability: moderate  Fetal Heart Rate decelerations: none  Fetal Heart " Rate accelerations: yes  Baseline FHR: 135 per minute  Uterine contractions: regular, every 4-6 minutes  Estimated Fetal Weight: 3000 - 3500g      Labs:      Prenatal Results     1st Trimester     Test Value Date Time    Hgb 13.9 g/dL 18 08    Hct 41.2 % 18 08    ABO B  18 08    Rh POS  18    Antibody screen NEG  18    Gonorrhea       Chlamydia       HSV 1       HSV 2       HPV       HBsAg Negative  18    HIV-1 and HIV-2 Antibodies       RPR       Rubella       TB       Pap smear       Urinalysis       Urine Drug Screen       Urine culture       HbA1c       Fasting Glucose Tolerance       GTT, 1 hour       GTT, 2 hours       GTT, 3 hours             2nd Trimester     Test Value Date Time    Hgb 12.3 g/dL 10/25/18 09    Hct 38.3 % 10/25/18 0951    Urinalysis       Urine Culture       AST       ALT       Uric Acid       Fasting Glucose Tolerance       GTT, 1 hour       GTT, 2 hours       GTT, 3 hours             3rd Trimester     Test Value Date Time    Hgb       Hct       Platelet count 303 K/uL 18    GBS POSITIVE  (A) 18    GBS (discrete) POSITIVE  (A) 18    Urinalysis       Urine Culture       Urine Drug Screen             Congenital Disease Screening     Test Value Date Time    First Trimester Screen       Quad Screen       Sickle Cell       Thalassemia       [facility]       Cystic Fibrosis Carrier Study                     Assessment:  Gestational Age:  39w0d  Labor State:   Labor, Active  Risk Factors:   group B strep colonizer  Pregnancy Complications: None    Patient Active Problem List    Diagnosis Date Noted   • Group beta Strep positive 2019   • Supervision of other normal pregnancy 2018       Plan:   Admitted for: Active Labor    CBC  routine urinalysis  GBS +  Antibiotics: Prophylaxis for GBS, will start ampicillin  IV meds or epidural as desired    ROSHAN MelendezNDENA.    Dr Nava is the  attending today

## 2019-01-13 NOTE — CARE PLAN
Problem: Altered physiologic condition related to immediate post-delivery state and potential for bleeding/hemorrhage  Goal: Patient physiologically stable as evidenced by normal lochia, palpable uterine involution and vital signs within normal limits  Outcome: PROGRESSING AS EXPECTED  Will assess patient every four hours.     Problem: Alteration in comfort related to episiotomy, vaginal repair and/or after birth pains  Goal: Patient is able to ambulate, care for self and infant  Outcome: PROGRESSING AS EXPECTED  Will medicate for pain as needed.

## 2019-01-13 NOTE — PROGRESS NOTES
In to see patient, feeling more pain with contractions  Desires epidural, but anesthesia is unavailable due to back to back OR cases  Admission for labor  GBS positive, VSS. Has received 2 doses of Amp at this time  FHT's- category 1 with mod variability, positive accels, neg decels, baseline of 140 bpm  TOCO with UC's every 3-5 minutes, palpate mod to firm  SVE 6/80/-1, plan to AROM, but patient declines until epidural in place  Pitocin running for augmentation, currently at 5mu/min  Anticipate   Will recheck after epidural and AROM at that time    Bettina Mccormick CNM

## 2019-01-13 NOTE — PROGRESS NOTES
In to see patient, comfortable with epidural  Reece placed by RN  VSS, GBS positive, 3 doses of Ampicillin given   FHT's- category 2 with mod variability, pos accels, some variable decels, baseline of 140bpm  TOCO with UC's every 4-5, palpate mod-firm  Pitocin at 9 mu/min  SVE- 7/80/-1, AROM with clear fluid noted  Anticipate     Bettina TAFOYAM

## 2019-01-13 NOTE — PROGRESS NOTES
Late Entry:  2300- assumed care of pt. Report received from GITA Mejía RN. Pt denies pain at this time.   0436- ROSHAN Mccormick CNM at bedside.   0442- SHIREENE 6/80/-1  0513- Dr. Peoples at bedside for epidural placement.   0700- report to FRANKO Khoury RN

## 2019-01-13 NOTE — PROGRESS NOTES
Report received from Shavonne CARRASQUILLO.  Patient is comfortable with epidural.  Bettina Mccormick at bedside for AROM at 0711/clear.  Patient positioned with peanut ball and resting.  Feeling pressure and complete at 0846.   at 0903.            Recovery: Firm/light/at U. Baby skin to skin.  Patient sitting up for breakfast at 1115 when she felt dizzy, pulse was at 122 on initial assessment and BP was 75/42, pr 68.  Patient reclined, O2 placed on patient, Dr York notified and patient to get liter of LR.  Patient feeling improved and able to resume eating breakfast.  1230 Reviewed CBC results with Dr York.  Patient transferred to post partum.  Report given to Pamela CARRASQUILLO.

## 2019-01-13 NOTE — L&D DELIVERY NOTE
VAGINAL DELIVERY PRODEDURE NOTE    PATIENT ID:  NAME:  Michelle Adame  MRN:               [id number]  YOB: 1985    On 2019 at 0903, this 33 y.o., now 39w1d , GBS + (amp x 3) female delivered via  under epidural anesthesia a viable male infant with weight pending with APGAR scores of 8 and 9 at one and five minutes. There was no nuchal cord. Cord was doubly clamped, cut and infant handed to RN in attendance. An intact placenta was delivered spontaneously at 0916 with 3 vessel cord. Upon vaginal exam, there was a second degree midline perineal laceration which was repaired using 2.0 and 3.0 vicryl in the usual sterile fashion. 800mcg cytotec administered. Estimated blood loss was 800cc. Patient will be transferred to postpartum in stable condition and infant to  nursery.    Abelardo Almaraz M.D.    Delivery attended by Dr. Baptiste

## 2019-01-13 NOTE — PROGRESS NOTES
2150 IV started and labs sent, WILLIAMS Torres at bedside for admit profile.   2220 patient transferred to s220  2231 antibiotics started

## 2019-01-14 VITALS
HEIGHT: 62 IN | DIASTOLIC BLOOD PRESSURE: 65 MMHG | RESPIRATION RATE: 17 BRPM | OXYGEN SATURATION: 97 % | WEIGHT: 157 LBS | TEMPERATURE: 97.6 F | BODY MASS INDEX: 28.89 KG/M2 | SYSTOLIC BLOOD PRESSURE: 101 MMHG | HEART RATE: 91 BPM

## 2019-01-14 PROCEDURE — A9270 NON-COVERED ITEM OR SERVICE: HCPCS | Performed by: NURSE PRACTITIONER

## 2019-01-14 PROCEDURE — A9270 NON-COVERED ITEM OR SERVICE: HCPCS | Performed by: OBSTETRICS & GYNECOLOGY

## 2019-01-14 PROCEDURE — 700112 HCHG RX REV CODE 229: Performed by: STUDENT IN AN ORGANIZED HEALTH CARE EDUCATION/TRAINING PROGRAM

## 2019-01-14 PROCEDURE — 700102 HCHG RX REV CODE 250 W/ 637 OVERRIDE(OP): Performed by: OBSTETRICS & GYNECOLOGY

## 2019-01-14 PROCEDURE — A9270 NON-COVERED ITEM OR SERVICE: HCPCS | Performed by: STUDENT IN AN ORGANIZED HEALTH CARE EDUCATION/TRAINING PROGRAM

## 2019-01-14 PROCEDURE — 700102 HCHG RX REV CODE 250 W/ 637 OVERRIDE(OP): Performed by: NURSE PRACTITIONER

## 2019-01-14 PROCEDURE — 700102 HCHG RX REV CODE 250 W/ 637 OVERRIDE(OP): Performed by: STUDENT IN AN ORGANIZED HEALTH CARE EDUCATION/TRAINING PROGRAM

## 2019-01-14 RX ORDER — FERROUS GLUCONATE 324(38)MG
324 TABLET ORAL 2 TIMES DAILY
Qty: 30 TAB | Refills: 3 | Status: SHIPPED | OUTPATIENT
Start: 2019-01-14

## 2019-01-14 RX ORDER — IBUPROFEN 800 MG/1
800 TABLET ORAL EVERY 8 HOURS PRN
Qty: 30 TAB | Refills: 0 | Status: SHIPPED | OUTPATIENT
Start: 2019-01-14

## 2019-01-14 RX ORDER — PSEUDOEPHEDRINE HCL 30 MG
100 TABLET ORAL 2 TIMES DAILY PRN
Qty: 60 CAP | Refills: 2 | Status: SHIPPED | OUTPATIENT
Start: 2019-01-14

## 2019-01-14 RX ORDER — ACETAMINOPHEN AND CODEINE PHOSPHATE 120; 12 MG/5ML; MG/5ML
1 SOLUTION ORAL DAILY
Qty: 28 TAB | Refills: 11 | Status: SHIPPED | OUTPATIENT
Start: 2019-01-14

## 2019-01-14 RX ADMIN — IBUPROFEN 800 MG: 800 TABLET, FILM COATED ORAL at 08:05

## 2019-01-14 RX ADMIN — DOCUSATE SODIUM 100 MG: 100 CAPSULE, LIQUID FILLED ORAL at 08:04

## 2019-01-14 RX ADMIN — Medication 1 TABLET: at 08:05

## 2019-01-14 RX ADMIN — FERROUS GLUCONATE 324 MG: 324 TABLET ORAL at 08:05

## 2019-01-14 ASSESSMENT — PAIN SCALES - GENERAL: PAINLEVEL_OUTOF10: 3

## 2019-01-14 NOTE — PROGRESS NOTES
Discharge instruction for mom and baby discussed with the help of the family. Prescription given and explained. Emphasized the importance of  screening follow-up test. Questions and concerns have been answered.

## 2019-01-14 NOTE — PROGRESS NOTES
POSTPARTUM PROGRESS NOTE    PATIENT ID:  NAME:  Michelle Adame  MRN:               [id number]  YOB: 1985     33 y.o. female admitted  now  at 39w1d PPD#1 s/p     Subjective: Abdominal pain: no  Ambulating: yes  Tolerating liquids: yes  Tolerating regular diet: yes  Flatus: yes  BM: no  Bleeding: yes - lochia  Voiding: yes  Dizziness: no  Breast feeding: yes  Breast tenderness: no    Objective:    Vitals:    19 1201 19 1254 19 1600 19 1921   BP:  105/71 102/66 (!) 96/63   Pulse: 89 83 82 84   Resp:     Temp:  37.2 °C (99 °F) 36.3 °C (97.3 °F) 36.3 °C (97.4 °F)   TempSrc:  Temporal Temporal Temporal   SpO2:  99% 97% 96%   Weight:       Height:         General: No acute distress, resting comfortably in bed.  HEENT: normocephalic, nontraumatic, PERRLA, EOMI  Cardiovascular: Heart RRR with no murmurs, rubs or gallops. Distal Pulses 2+  Respiratory: symmetric chest expansion, lungs CTA bilaterally with no wheezes rales or rhonci  Abdomen: soft, mildly tender, fundus firm, +BS  Genitourinary: lochia light, denies excessive vaginal bleeding  Musculoskeletal: strength 5/5 in four extremities  Neuro: non focal with no numbness, tingling or changes in sensation    Recent Labs      19   2150  19   1135  19   1943   WBC  8.4  22.7*  17.7*   RBC  4.18*  2.94*  2.89*   HEMOGLOBIN  12.7  9.0*  8.9*   HEMATOCRIT  38.2  27.9*  27.0*   MCV  91.4  94.6  93.4   MCH  30.4  30.3  30.8   RDW  45.5  47.0  46.3   PLATELETCT  342  265  276   MPV  9.8  9.9  10.0   NEUTSPOLYS  63.90  84.80*   --    LYMPHOCYTES  23.20  6.40*   --    MONOCYTES  11.20  7.80   --    EOSINOPHILS  1.00  0.00   --    BASOPHILS  0.20  0.40   --      No results for input(s): SODIUM, POTASSIUM, CHLORIDE, CO2, GLUCOSE, BUN, CPKTOTAL in the last 72 hours.    Current Meds:   Current Facility-Administered Medications   Medication Dose Frequency Provider Last Rate Last Dose   • ondansetron  (ZOFRAN ODT) dispertab 4 mg  4 mg Q6HRS PRN Bettina Mccormick, C.N.M.        Or   • ondansetron (ZOFRAN) syringe/vial injection 4 mg  4 mg Q6HRS PRN Bettina Mccormick, C.N.M.       • oxytocin (PITOCIN) infusion (for postpartum)  2,000 mL/hr Once SUZANNE Melendez.N.M. 2,000 mL/hr at 01/13/19 0916 2,000 mL/hr at 01/13/19 0916    Followed by   • oxytocin (PITOCIN) infusion (for postpartum)   mL/hr Continuous Bettina Mccormick C.N.M. 125 mL/hr at 01/13/19 0957 125 mL/hr at 01/13/19 0957   • ibuprofen (MOTRIN) tablet 800 mg  800 mg Q8HRS PRN Bettina Mccormick, C.N.M.   800 mg at 01/13/19 2243   • acetaminophen (TYLENOL) tablet 325 mg  325 mg Q4HRS PRN Bettina Mccormick, C.N.M.       • oxyCODONE-acetaminophen (PERCOCET) 5-325 MG per tablet 1 Tab  1 Tab Q4HRS PRN Bettina Mccormick, C.N.M.   1 Tab at 01/13/19 2243   • oxyCODONE-acetaminophen (PERCOCET-10)  MG per tablet 1 Tab  1 Tab Q4HRS PRN Bettina Mccormick, C.N.M.       • ropivacaine (NAROPIN) injection   Continuous Leandro Peoples M.D.   100 mL at 01/13/19 0518   • LR infusion   PRN Abelardo Almaraz M.D.       • docusate sodium (COLACE) capsule 100 mg  100 mg BID PRN Abelardo Almaraz M.D.   100 mg at 01/13/19 1404   • bisacodyl (DULCOLAX) suppository 10 mg  10 mg PRN Abelardo Almaraz M.D.       • prenatal plus vitamin (STUARTNATAL 1+1) 27-1 MG tablet 1 Tab  1 Tab QAM Abelardo Almaraz M.D.   1 Tab at 01/13/19 1404   • lactated ringers infusion  1,000 mL Continuous Nellie Berrydale, M.D. 999 mL/hr at 01/13/19 1125 1,000 mL at 01/13/19 1125   • ferrous gluconate (FERGON) tablet 324 mg  324 mg BID Nellie Baptiste M.D.   324 mg at 01/13/19 1404   • D5LR infusion   Continuous ROSHAN MelendezNDavionMDavion       • oxytocin (PITOCIN) infusion (for induction)  0.5-20 christa-units/min Continuous SUZANNE Melendez.N.M. 33 mL/hr at 01/13/19 0750 11 christa-units/min at 01/13/19 0750     Last reviewed on 1/12/2019  9:46 PM by Negin Jacob R.N.     Assessment:  33 y.o.  female  at 39w1d PPD#1 s/p       Plan:   1. Routine postpartum care  2. Encourage breast feeding  3. GBS + likely DC tomorrow after 48hr obs    Continue routine postpartum care, encourage ambulation, pain control, anticipate D/C home on PPD# 1    Zane Green M.D.

## 2019-01-14 NOTE — DISCHARGE INSTRUCTIONS
POSTPARTUM DISCHARGE INSTRUCTIONS FOR MOM    YOB: 1985   Age: 33 y.o.               Admit Date: 1/12/2019     Discharge Date: 1/14/2019  Attending Doctor:  Lucretia Nava D.O.                  Allergies:  Patient has no known allergies.    Discharged to home by car. Discharged via wheelchair, hospital escort: Yes.  Special equipment needed: Not Applicable  Belongings with: Personal  Be sure to schedule a follow-up appointment with your primary care doctor or any specialists as instructed.     Discharge Plan:   Diet Plan: Discussed  Activity Level: Discussed  Confirmed Follow up Appointment: Patient to Call and Schedule Appointment  Medication Reconciliation Updated: Yes  Influenza Vaccine Indication: Not indicated: Previously immunized this influenza season and > 8 years of age    REASONS TO CALL YOUR OBSTETRICIAN:  1.   Persistent fever or shaking chills (Temperature higher than 100.4)  2.   Heavy bleeding (soaking more than 1 pad per hour); Passing clots  3.   Foul odor from vagina  4.   Mastitis (Breast infection; breast pain, chills, fever, redness)  5.   Urinary pain, burning or frequency  6.   Episiotomy infection  7.   Severe depression longer than 24 hours    HAND WASHING  · Prior to handling the baby.  · Before breastfeeding or bottle feeding baby.  · After using the bathroom or changing the baby's diaper.    VAGINAL CARE  · Nothing inside vagina for 6 weeks: no sexual intercourse, tampons or douching.  · Bleeding may continue for 2-4 weeks.  Amount may vary.    · Call your physician for heavy bleeding which means soaking more than 1 pad per hour    BIRTH CONTROL  · It is possible to become pregnant at any time after delivery and while breastfeeding.  · Plan to discuss a method of birth control with your physician at your follow up visit. visit.    DIET AND ELIMINATION  · Eating more fiber (bran cereal, fruits, and vegetables) and drinking plenty of fluids will help to avoid  "constipation.  · Urinary frequency after childbirth is normal.    POSTPARTUM BLUES  During the first few days after birth, you may experience a sense of the \"blues\" which may include impatience, irritability or even crying.  These feeling come and go quickly.  However, as many as 1 in 10 women experience emotional symptoms known as postpartum depression.    Postpartum depression:  May start as early as the second or third day after delivery or take several weeks or months to develop.  Symptoms of \"blues\" are present, but are more intense:  Crying spells; loss of appetite; feelings of hopelessness or loss of control; fear of touching the baby; over concern or no concern at all about the baby; little or no concern about your own appearance/caring for yourself; and/or inability to sleep or excessive sleeping.  Contact your physician if you are experiencing any of these symptoms.    Crisis Hotline:  · [place] Crisis Hotline:  6-233-GMMNFNU  Or 1-634.653.1786  · [place] Crisis Hotline:  1-325.273.9705  Or 863-936-3035    PREVENTING SHAKEN BABY:  If you are angry or stressed, PUT THE BABY IN THE CRIB, step away, take some deep breaths, and wait until you are calm to care for the baby.  DO NOT SHAKE THE BABY.  You are not alone, call a supporter for help.    · Crisis Call Center 24/7 crisis line 815-845-1574 or 1-983.382.8820  · You can also text them, text \"ANSWER\" to 377208    QUIT SMOKING/TOBACCO USE:  I understand the use of any tobacco products increases my chance of suffering from future heart disease and could cause other illnesses which may shorten my life. Quitting the use of tobacco products is the single most important thing I can do to improve my health. For further information on smoking / tobacco cessation call a Toll Free Quit Line at 1-384.917.1327 (*National [employer]) or 1-949.222.9602 (American Lung Association) or you can access the web based program at www.lungusa.org.    · [place] Tobacco Users " Help Line:  (145) 279-2253       Toll Free: 1-403.482.1833  · Quit Tobacco Program [facility] (591)274-7581    DEPRESSION / SUICIDE RISK:  As you are discharged from this [facility], it is important to learn how to keep safe from harming yourself.    Recognize the warning signs:  · Abrupt changes in personality, positive or negative- including increase in energy   · Giving away possessions  · Change in eating patterns- significant weight changes-  positive or negative  · Change in sleeping patterns- unable to sleep or sleeping all the time   · Unwillingness or inability to communicate  · Depression  · Unusual sadness, discouragement and loneliness  · Talk of wanting to die  · Neglect of personal appearance   · Rebelliousness- reckless behavior  · Withdrawal from people/activities they love  · Confusion- inability to concentrate     If you or a loved one observes any of these behaviors or has concerns about self-harm, here's what you can do:  · Talk about it- your feelings and reasons for harming yourself  · Remove any means that you might use to hurt yourself (examples: pills, rope, extension cords, firearm)  · Get professional help from the community (Mental Health, Substance Abuse, psychological counseling)  · Do not be alone:Call your Safe Contact- someone whom you trust who will be there for you.  · Call your local CRISIS HOTLINE 114-0417 or 133-066-5731  · Call your local Children's Mobile Crisis Response Team [place] (738) 050-4486 or www.Reputation.com  · Call the toll free National Suicide Prevention Hotlines   · National Suicide Prevention Lifeline 445-892-AWYE (9798)  · National Hope Line Network 800-SUICIDE (530-0783)    DISCHARGE SURVEY:  Thank you for choosing [facility].  We hope we provided you with very good care.  You may be receiving a survey in the mail.  Please fill it out.  Your opinion is valuable to us.    ADDITIONAL EDUCATIONAL MATERIALS GIVEN TO  PATIENT:        My signature on this form indicates that:  1.  I have reviewed and understand the above information  2.  My questions regarding this information have been answered to my satisfaction.  3.  I have formulated a plan with my discharge nurse to obtain my prescribed medication for home.    DISCHARGE INSTRUCTIONS (Qatari) POSTPARTUM FOR MOM      TATYANA LAS BERTHA:  · Antes de tocar el bebé.  · Antes del amamantamiento o darle al bebé lactancia artificial.  · Después de usar el baño.    CUIDADO DE LAS HERIDAS:  · La Incisión de la Operación Cesárea:  Mantenga limpea y seca, NO levante nada que pesa más que menon bebé por 6 semenas.  No debe ninguna apertura ni pus.  · Episiotomía/Shannon Vaginal:  Use un spray de Tucks o Dermoplast, tome un baño de asiento cuando necesite (6 pulgadas), siga usando la botella Sonia hasta que pare de sangrar.    CUIDADA DEL SENO:  · Lleve un sostén.  · Si esta` amamantando no use jabón en el seno ni en los pezones.  · Aplique lanolina si los pezones se ponen quebrazados y si le duelen.    CUIDADO DE LA VAGINA:  · [place] puede entrar la vagina por 6 semanas:  Actividad sexual, Ducha vaginal, Tampones.  · El sangramiento puede seguir por 2 a 4 semanas.  La cantidad es variable.  Llame a menon med`ico(a) obstetra si hay mucha ede (si usa ma`s de alfred toalla femenina cada hora).    CONTRACEPCIÒN:  · Es posible embarazarse en cualquier tiempo despus del parto y mientras el amamantamiento.  · Debe planear de discutir los metodos de cantracepción con menon médico(a) cuando vuelva en 6 semanas para menon revisión médica.    DIETA Y DEFECACÒN:  · Para evitar la constipación coma mas fibra (cereal salvado, fruta, y verduras) Y tome muchos liquidos.   · Es común orinar frecuentemente después del parto.    POSTPARTO Y LA DEPRESÒN:  Lisa los primeros guerra después del parto es común sentirse:  · Impaciente, irritable, o llorar  Estos sentimientos llegan y van rapidamente.  No obstante, edilma alfred de cada  terrie mujeres tiene síntomas emocionales conocidos lesli depresión postparto.  · Despresión Postparto:  Puede empezar tan pronto lesli el beau o tercer día después del parto o puede durar algunas semanas o meses para desarrollar.  Síntomas de la depresión pueden presentarse, javon son más intensos:  · Pérdida del hambre  · Frecuencia de llorar  · Sentirse desesperada o perder el control  · Demasiada o no suficiente preocupación con menon bebé  · Tener miedo de tocar el bebé  · No preocuparse con menon propia apariencia  · Incapacidad de dormir o dormir excesivamente    DEPRESIÒN / RIESGO AL SUICIDIO:    Cuando se le da de shaan de alguna entidad de [facility], es importante aprender a mantener a sherita de hacerse daño a sí mismo.    Reconocer los signos de advertencia:  · Cambios bruscos en la peronalidad, positiva o negativa, incluyendo aumento de la energia  · Regalar posesiones  · Cambios en patrones de comer - significativos cambios de peso - positivos o negativos  · Cambio de patrones para dormir - no poder dormir o dormir todo el tiempo  · Falta de voluntad o incapacidad de comunicarse  · Depresión  · Linda, desánimo y tristeza inusual  · Habla de querer morir  · Descuido del aspecto personal  · Rebeldía - comportamiento imprudente  · Retiro de personas y actividades que les gusta  · Confusión-incapacidad para concentrarse    Si usted o un ser querido observa cualquiera de estos comportamientos o tiene preocupaciones de hacerse daño a si mismo, aquí le damos lo que usted puede hacer:  · Hablar de ti - tus sentimientos y razones para hacerse eva a si mismo  · Retire cualquier medio que se podría utilizar para lastimarse (ejemplos: píldoras, cuerdas, cordones de extensión, arma de marie)  · Obtenga ayuda profesional de la comunidad (mk mental, abuso de sustancias, orientación psicológica)  · No estar solo: llamar a un contacto seguro - alfred persona que confía en que estará allí por usted  · Llamada local L´INEA  de CRISIS [phone] y [phone]  · Llamada [facility] [employer] Para Crisis de [place] [facility] [phone] or www.Transfercar.com  · Llamada gratuita nacional, líneas de prevención del suicidio  · Preventión del Suicidio Nacional [facility] [phone] ([phone])  · Línea Nacional de la [place] 800-SUICIDE ([phone])       ([employer])

## 2019-01-14 NOTE — DISCHARGE SUMMARY
NORMAL SPONTANEOUS VAGINAL DISCHARGE SUMMARY    PATIENT ID:  NAME:  Michelle Adame  MRN:               [id number]  YOB: 1985    DATE OF ADMISSION: 2019    DATE OF DISCHARGE: 2019     ADMITTING DIAGNOSIS: Intrauterine pregnancy at 39w1d.    DISCHARGE DIAGNOSIS: s/p     HOSPITAL COURSE: This is a 33 y.o. year old female admitted at [place] [address] at 39w1d who presented with contractions, no LOF, no vaginal bleeding, normal FM and in active labor. Pt was 5 cm dilated, 80% effaced and at  -2 station on sterile vaginal exam. Pregnancy was uncomplicated. The patient had a good labor pattern after admission and proceeded to deliver a viable male infant. Infants Apgars scores were 8 and 9 at one and five minutes. The patients postpartum course was uncomplicated and she was discharged home in stable condition on postpartum day #1.    PROCEDURES PERFORMED: Normal spontaneous vaginal delivery. Upon vaginal exam a complex 2nd perineal laceration was noted which was repaired using 2-0 and 3-0 Vicryl in the usual sterile fashion.    COMPLICATIONS: None    DIET: regular    ACTIVITY: No intercourse and nothing inserted into the vagina for 6 weeks.    MEDICATIONS:  Current Outpatient Prescriptions   Medication Sig Dispense Refill   • ibuprofen (MOTRIN) 800 MG Tab Take 1 Tab by mouth every 8 hours as needed (For cramping after delivery; do not give if patient is receiving ketorolac (Toradol)). 30 Tab 0   • ferrous gluconate (FERGON) 324 (38 Fe) MG Tab Take 1 Tab by mouth 2 times a day. 30 Tab 3   • docusate sodium 100 MG Cap Take 100 mg by mouth 2 times a day as needed for Constipation. 60 Cap 2   • norethindrone (MICRONOR) 0.35 MG tablet Take 1 Tab by mouth every day. 28 Tab 11         FOLLOWUP:  1) The pregnancy center in 1 and 6 weeks  2) Return to the hospital if copious vaginal bleeding or foul smelling discharge is noted    Zane Green M.D.

## 2019-01-14 NOTE — PROGRESS NOTES
A bedside report received from Halle CARRASQUILLO @ the change of shift.  Assumed care. Discussed plan of care especially on managing pain. Assessment done. Medicated for pain. Encouraged to call if with needs. Will check at intervals.

## 2019-01-14 NOTE — CARE PLAN
Problem: Potential for postpartum infection related to presence of episiotomy/vaginal tear and/or uterine contamination  Goal: Patient will be absent from signs and symptoms of infection  Outcome: PROGRESSING AS EXPECTED  Encouraged ambulation.     Problem: Alteration in comfort related to episiotomy, vaginal repair and/or after birth pains  Goal: Patient is able to ambulate, care for self and infant  Will medicate for pain as needed. She prefer to call for it.

## 2019-01-14 NOTE — LACTATION NOTE
"This note was copied from a baby's chart.  Baby 39.1 weeks, PPH -  ml, Emirati speaking- used ipad  [id number]. Mother reports breast fed previous baby for 1 month. Mother wants to breastfeed however, feels that she does not have enough breast milk, has supplemented with formula. Discussed with mother \"supply & demand\" baby needs to latch frequently at breast, at least 8-12 breastfeeds in 24 hours to bring in full supply of breast milk. Mother has been using pacifier, educated mother on limited pacifier use in first 4 weeks, to protect milk supply. Breast massage & hand expression demo done, difficult to express drop of colostrum. Asked mother if I could latch baby to demonstrate deep latch, baby not showing cues- mother agreed. Assisted baby to left breast using cross cradle hold, skin to skin, baby latched with coordinated suck, Mother denies pain with latch. Mother has [facility], encouraged mother to F/U with WIC once discharged.     Teaching on hunger cues, breastfeeding when baby shows cues or by 3 hours from last feed, importance of skin to skin, positioning baby at breast, getting baby to open wide for deep latch & cluster feeding.     Breastfeeding POC:  Breastfeed on demand or by 3 hours from last feed. F/U with WIC for outpatient lactation support.   "

## (undated) DEVICE — LACTATED RINGERS INJ 1000 ML - (14EA/CA 60CA/PF)

## (undated) DEVICE — STAPLER SKIN DISP - (6/BX 10BX/CA) VISISTAT

## (undated) DEVICE — KIT ROOM DECONTAMINATION

## (undated) DEVICE — PACK LAP CHOLE OR - (2EA/CA)

## (undated) DEVICE — SCISSORS 5MM CVD (6EA/BX)

## (undated) DEVICE — CHLORAPREP 26 ML APPLICATOR - ORANGE TINT(25/CA)

## (undated) DEVICE — SODIUM CHL IRRIGATION 0.9% 1000ML (12EA/CA)

## (undated) DEVICE — TUBING INSUFFLATION - (10/BX)

## (undated) DEVICE — SUTURE 0 VICRYL PLUS CT-2 - 27 INCH (36/BX)

## (undated) DEVICE — TROCAR, SEPARATOR THREAD CAN 12X100 C0680

## (undated) DEVICE — SET SUCTION/IRRIGATION WITH DISPOSABLE TIP (6/CA )PART #0250-070-520 IS A SUB

## (undated) DEVICE — TUBE CONNECT SUCTION CLEAR 120 X 1/4" (50EA/CA)"

## (undated) DEVICE — PROTECTOR ULNA NERVE - (36PR/CA)

## (undated) DEVICE — SET LEADWIRE 5 LEAD BEDSIDE DISPOSABLE ECG (1SET OF 5/EA)

## (undated) DEVICE — NEPTUNE 4 PORT MANIFOLD - (20/PK)

## (undated) DEVICE — MASK ANESTHESIA ADULT  - (100/CA)

## (undated) DEVICE — TUBING CLEARLINK DUO-VENT - C-FLO (48EA/CA)

## (undated) DEVICE — SUTURE 4-0 VICRYL PLUS FS-2 - 27 INCH (36/BX)

## (undated) DEVICE — CANNULA W/SEAL 5X100 Z-THRE - ADED KII (12/BX)

## (undated) DEVICE — ELECTRODE DUAL RETURN W/ CORD - (50/PK)

## (undated) DEVICE — CANISTER SUCTION 3000ML MECHANICAL FILTER AUTO SHUTOFF MEDI-VAC NONSTERILE LF DISP  (40EA/CA)

## (undated) DEVICE — SET EXTENSION WITH 2 PORTS (48EA/CA) ***PART #2C8610 IS A SUBSTITUTE*****

## (undated) DEVICE — TROCAR Z THREAD 12 X 100 - BLADED (6/BX)

## (undated) DEVICE — SUTURE GENERAL

## (undated) DEVICE — SENSOR SPO2 NEO LNCS ADHESIVE (20/BX) SEE USER NOTES

## (undated) DEVICE — DETERGENT RENUZYME PLUS 10 OZ PACKET (50/BX)

## (undated) DEVICE — GLOVE BIOGEL ECLIPSE PF LATEX SIZE 8.0  (50PR/BX)

## (undated) DEVICE — ELECTRODE 850 FOAM ADHESIVE - HYDROGEL RADIOTRNSPRNT (50/PK)

## (undated) DEVICE — SUCTION INSTRUMENT YANKAUER BULBOUS TIP W/O VENT (50EA/CA)

## (undated) DEVICE — KIT ANESTHESIA W/CIRCUIT & 3/LT BAG W/FILTER (20EA/CA)

## (undated) DEVICE — TUBE E-T HI-LO CUFF 7.0MM (10EA/PK)

## (undated) DEVICE — APPICATOR HEMOSTAT ARISTA XL - FLEXITIP (10EA/CA)

## (undated) DEVICE — DERMABOND ADVANCED - (12EA/BX)

## (undated) DEVICE — TROCAR 5X100 BLADED Z-THREAD - KII (6/BX)

## (undated) DEVICE — BAG RETRIEVAL 10ML (10EA/BX)

## (undated) DEVICE — CLIP MED LG INTNL HRZN TI ESCP - (20/BX)

## (undated) DEVICE — GLOVE BIOGEL SZ 8 SURGICAL PF LTX - (50PR/BX 4BX/CA)

## (undated) DEVICE — HEMOSTAT ARISTA PWD 5 GRAM - (5/BX)

## (undated) DEVICE — WATER IRRIG. STER. 1500 ML - (9/CA)

## (undated) DEVICE — SUTURE 0 COATED VICRYL 6-18IN - (12PK/BX)

## (undated) DEVICE — HEAD HOLDER JUNIOR/ADULT

## (undated) DEVICE — GOWN WARMING STANDARD FLEX - (30/CA)

## (undated) DEVICE — GLOVE BIOGEL SZ 8.5 SURGICAL PF LTX - (50PR/BX 4BX/CA)